# Patient Record
Sex: MALE | Race: WHITE | NOT HISPANIC OR LATINO | Employment: OTHER | URBAN - METROPOLITAN AREA
[De-identification: names, ages, dates, MRNs, and addresses within clinical notes are randomized per-mention and may not be internally consistent; named-entity substitution may affect disease eponyms.]

---

## 2017-04-06 ENCOUNTER — OFFICE VISIT (OUTPATIENT)
Dept: CARDIOLOGY | Facility: CLINIC | Age: 72
End: 2017-04-06

## 2017-04-06 VITALS
HEIGHT: 70 IN | RESPIRATION RATE: 18 BRPM | BODY MASS INDEX: 24.48 KG/M2 | SYSTOLIC BLOOD PRESSURE: 150 MMHG | WEIGHT: 171 LBS | DIASTOLIC BLOOD PRESSURE: 62 MMHG | HEART RATE: 82 BPM

## 2017-04-06 DIAGNOSIS — I65.29 STENOSIS OF CAROTID ARTERY, UNSPECIFIED LATERALITY: ICD-10-CM

## 2017-04-06 DIAGNOSIS — N18.6 END-STAGE RENAL DISEASE (HCC): ICD-10-CM

## 2017-04-06 DIAGNOSIS — E78.2 MIXED HYPERLIPIDEMIA: ICD-10-CM

## 2017-04-06 DIAGNOSIS — I10 ESSENTIAL HYPERTENSION: ICD-10-CM

## 2017-04-06 DIAGNOSIS — I25.10 CORONARY ARTERIOSCLEROSIS IN NATIVE ARTERY: Primary | ICD-10-CM

## 2017-04-06 DIAGNOSIS — I71.40 ABDOMINAL AORTIC ANEURYSM (AAA) WITHOUT RUPTURE (HCC): ICD-10-CM

## 2017-04-06 PROCEDURE — 93000 ELECTROCARDIOGRAM COMPLETE: CPT | Performed by: INTERNAL MEDICINE

## 2017-04-06 PROCEDURE — 99214 OFFICE O/P EST MOD 30 MIN: CPT | Performed by: INTERNAL MEDICINE

## 2017-04-06 NOTE — PROGRESS NOTES
PATIENTINFORMATION    Date of Office Visit: 2017  Encounter Provider: Jane Rider MD  Place of Service: [facility] CARDIOLOGY  Patient Name: Lukasz Flynn  : 1945    Subjective:     Encounter Date:2017      Patient ID: Lukasz Flynn is a 71 y.o. male.      History of Present Illness    The patient is a 70-year-old male who I have been following when he comes in for visits since the winter of 2011. I initially saw him for an abnormal EKG. A stress test in 2011 showed a diminished ejection fraction at 40-45% with left ventricular hypertrophy. The stress test was abnormal which led to a heart catheterization . This showed significant three vessel coronary disease. While in the hospital in 2011 for his heart catheterization, he was found to have significant carotid disease. An endovascular attempt to stent his carotid was unsuccessful. In 2011 he had a left carotid endarterectomy. In 2011 he was admitted with renal failure which required dialysis. 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 After he left the hospital in the spring of 2011 he failed to follow up with me. He did followup with his nephrologist and was placed on hemodialysis.      I saw him again in the hospital in the spring of 2012. At this time, he was admitted with MRSA bacteremia and a non-ST elevation myocardial infarction. Repeat heart catheterization  continued to show significant three vessel disease. A repeat transesophageal echocardiogram on 2012 showed no evidence of endocarditis, but he did have a small patent foramen ovale and significant atherosclerotic disease throughout the entire aorta with multiple mobile plaques identified. He came back to see me in the office in 2012 and was doing quite well. I had him followup with Dr. Anthony to schedule his bypass surgery. He was scheduled for  but presented on  June 30th with chest pain and a non-ST elevation myocardial infarction. He emergently went to the operating room on July 1st where he had a four vessel bypass including a LIMA to the LAD, vein graft to the right coronary artery, vein graft to the first marginal branch of the circumflex and vein graft to the second marginal branch of the circumflex. 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 Overall, his postoperative course was unremarkable. I saw him in August 2012 and at that time he was doing well.     I saw him in 04/2015, which was about three years after his last appointment. At that time, he was complaining of noticing some spots in his eyes and some of his field of vision being obscured and brown. He has had a left carotid endarterectomy. His right carotid has apparently been stable at 60% and he follows with one of the vascular surgeons. I ordered a 48-hour Holter monitor 4/15, which was normal. I ordered an Echocardiogram 4/15, which showed left ventricular systolic function with ejection fraction of 57%, stage I-A diastolic dysfunction, mild left atrial enlargement, and no significant valvular heart disease. He also had an MRI of the brain, which showed no acute infarct.  There were tiny old infarcts noted as well as some hemosiderin deposits likely from old microhemorrhages in the past. He was supposed to follow up with me but did not.  Before Christmas 2015, he was on a chair trying to fix a clock and the chair slipped and  he fell and broke his left wrist. He came to see me in January 2016 for preoperative clearance for her prescription wrist surgery.     He comes in today for his yearly appointment.  He has been feeling well.  He denies chest pain, shortness of breath, lower extremity edema, fatigue.  He is tolerating dialysis.  He says his blood pressures usually little high at the start of dialysis but is low at the end of dialysis.  He is now following with Dr. Ge  "for his vascular disease.    Review of Systems   Constitution: Negative for fever, malaise/fatigue, weight gain and weight loss.   HENT: Negative for ear pain, hearing loss, nosebleeds and sore throat.    Eyes: Negative for double vision, pain, vision loss in left eye and vision loss in right eye.   Cardiovascular:        See history of present illness.   Respiratory: Negative for cough, shortness of breath, sleep disturbances due to breathing, snoring and wheezing.    Endocrine: Negative for cold intolerance, heat intolerance and polyuria.   Skin: Negative for itching, poor wound healing and rash.   Musculoskeletal: Negative for joint pain, joint swelling and myalgias.   Gastrointestinal: Negative for abdominal pain, diarrhea, hematochezia, nausea and vomiting.   Genitourinary: Negative for hematuria and hesitancy.   Neurological: Negative for numbness, paresthesias and seizures.   Psychiatric/Behavioral: Negative for depression. The patient is not nervous/anxious.            ECG 12 Lead  Date/Time: 4/6/2017 10:58 AM  Performed by: JOSE EDGAR  Authorized by: JOSE EDGAR   Comparison: compared with previous ECG from 4/5/2016  Similar to previous ECG  Rhythm: sinus rhythm  BPM: 82  Other findings: LVH with strain  Clinical impression: abnormal ECG               Objective:     /62 (BP Location: Left arm, Patient Position: Sitting, Cuff Size: Adult)  Pulse 82  Resp 18  Ht 70\" (177.8 cm)  Wt 171 lb (77.6 kg)  BMI 24.54 kg/m2 Body mass index is 24.54 kg/(m^2).     Physical Exam   Constitutional: He appears well-developed.   HENT:   Head: Normocephalic and atraumatic.   Eyes: Conjunctivae and lids are normal. Pupils are equal, round, and reactive to light. Lids are everted and swept, no foreign bodies found.   Neck: Normal range of motion. No JVD present. Carotid bruit is not present. No tracheal deviation present. No thyroid mass present.   Cardiovascular: Normal rate, regular rhythm and normal " heart sounds.    Pulses:       Dorsalis pedis pulses are 2+ on the right side, and 2+ on the left side.   Pulmonary/Chest: Effort normal and breath sounds normal.   Abdominal: Normal appearance and bowel sounds are normal.   Musculoskeletal: Normal range of motion.   Neurological: He is alert. He has normal strength.   Skin: Skin is warm, dry and intact.   Psychiatric: He has a normal mood and affect. His behavior is normal.   Vitals reviewed.      Lab Review: There is no recent fasting lipid panel      Assessment/Plan:       1. Coronary artery disease status post bypass in 07/2012. He completed cardiac rehabilitation. He has no symptoms to suggest active ischemia.   Coronary Artery Disease  Assessment  • The patient has no angina    Plan  • Lifestyle modifications discussed include medication compliance, regular exercise and regular monitoring of cholesterol and blood pressure    Subjective - Objective  • There is a history of previous coronary artery bypass graft on or around 7/1/2012  • Current antiplatelet therapy includes aspirin 81 mg      2. History of abnormal left ventricular systolic function. His last ejection fraction was normal at 57% but he did have some anterolateral hypokinesis. He is on a beta-blocker.   3. Peripheral vascular disease status post left carotid endarterectomy. He follows with Dr. Lovell.    4. Infra-abdominal aortic aneurysm s/p stent graft. followed by .   5. Hypertension controlled.  His blood pressures a little bit high.  I don't want to adjust his medicines in case he starts having hypotension with dialysis.   6. Hyperlipidemia.  It has been 2 years since his last lipid panel.  I put in orders for one today.  He has not fasting so he will do it next week.  I want his LDL to be less than 70 given his extensive atheromatous burden.  He has never tried Lipitor or Crestor.  7. History of pulmonary nodules.   8. End-stage renal disease. He is on hemodialysis.   9. History of  stroke.   10. History of poor nutrition with a history of G-tube but he is no longer having issues with that.   11. History of Methicillin-resistant Staphylococcus aureus bacteremia.      I encouraged regular exercise. I will see him back in one year unless he has problems sooner.      Orders Placed This Encounter   Procedures   • Lipid Panel     Standing Status:   Future     Standing Expiration Date:   4/6/2018   • Comprehensive Metabolic Panel     Standing Status:   Future     Standing Expiration Date:   4/6/2018      Lukasz Flynn   Home Medication Instructions MARIBEL:    Printed on:04/06/17 0325   Medication Information                      aspirin 81 MG chewable tablet  Chew 81 mg daily. LAST DOSE 6/8/2016             Lactobacillus (ACIDOPHILUS) 100 MG capsule  Take 1 tablet by mouth daily.             metoprolol succinate XL (TOPROL-XL) 25 MG 24 hr tablet  Take 25 mg by mouth every night.             Multiple Vitamin (MULTI VITAMIN DAILY PO)  Take 1 tablet by mouth daily. LAST DOSE 6/2/2016             simvastatin (ZOCOR) 40 MG tablet  Take 40 mg by mouth every night.                        Jane Rider MD  04/06/17  10:55 AM

## 2017-04-11 ENCOUNTER — RESULTS ENCOUNTER (OUTPATIENT)
Dept: CARDIOLOGY | Facility: CLINIC | Age: 72
End: 2017-04-11

## 2017-04-11 DIAGNOSIS — E78.2 MIXED HYPERLIPIDEMIA: ICD-10-CM

## 2017-04-14 ENCOUNTER — APPOINTMENT (OUTPATIENT)
Dept: LAB | Facility: HOSPITAL | Age: 72
End: 2017-04-14

## 2017-04-14 ENCOUNTER — TELEPHONE (OUTPATIENT)
Dept: CARDIOLOGY | Facility: CLINIC | Age: 72
End: 2017-04-14

## 2017-04-14 DIAGNOSIS — I25.10 CORONARY ARTERY DISEASE INVOLVING NATIVE CORONARY ARTERY OF NATIVE HEART WITHOUT ANGINA PECTORIS: ICD-10-CM

## 2017-04-14 DIAGNOSIS — E78.2 MIXED HYPERLIPIDEMIA: Primary | ICD-10-CM

## 2017-04-14 LAB
ALBUMIN SERPL-MCNC: 4.2 G/DL (ref 3.5–5.2)
ALBUMIN/GLOB SERPL: 1.1 G/DL
ALP SERPL-CCNC: 97 U/L (ref 39–117)
ALT SERPL W P-5'-P-CCNC: 13 U/L (ref 1–41)
ANION GAP SERPL CALCULATED.3IONS-SCNC: 11.4 MMOL/L
AST SERPL-CCNC: 19 U/L (ref 1–40)
BILIRUB SERPL-MCNC: 0.6 MG/DL (ref 0.1–1.2)
BUN BLD-MCNC: 8 MG/DL (ref 8–23)
BUN/CREAT SERPL: 4.4 (ref 7–25)
CALCIUM SPEC-SCNC: 8.7 MG/DL (ref 8.6–10.5)
CHLORIDE SERPL-SCNC: 94 MMOL/L (ref 98–107)
CHOLEST SERPL-MCNC: 181 MG/DL (ref 0–200)
CO2 SERPL-SCNC: 36.6 MMOL/L (ref 22–29)
CREAT BLD-MCNC: 1.83 MG/DL (ref 0.76–1.27)
GFR SERPL CREATININE-BSD FRML MDRD: 37 ML/MIN/1.73
GLOBULIN UR ELPH-MCNC: 3.8 GM/DL
GLUCOSE BLD-MCNC: 168 MG/DL (ref 65–99)
HDLC SERPL-MCNC: 49 MG/DL (ref 40–60)
LDLC SERPL CALC-MCNC: 115 MG/DL (ref 0–100)
LDLC/HDLC SERPL: 2.34 {RATIO}
POTASSIUM BLD-SCNC: 3.4 MMOL/L (ref 3.5–5.2)
PROT SERPL-MCNC: 8 G/DL (ref 6–8.5)
SODIUM BLD-SCNC: 142 MMOL/L (ref 136–145)
TRIGL SERPL-MCNC: 87 MG/DL (ref 0–150)
VLDLC SERPL-MCNC: 17.4 MG/DL (ref 5–40)

## 2017-04-14 PROCEDURE — 36415 COLL VENOUS BLD VENIPUNCTURE: CPT | Performed by: INTERNAL MEDICINE

## 2017-04-14 PROCEDURE — 80053 COMPREHEN METABOLIC PANEL: CPT | Performed by: INTERNAL MEDICINE

## 2017-04-14 PROCEDURE — 80061 LIPID PANEL: CPT | Performed by: INTERNAL MEDICINE

## 2017-04-14 RX ORDER — ROSUVASTATIN CALCIUM 40 MG/1
40 TABLET, COATED ORAL DAILY
Qty: 30 TABLET | Refills: 11 | Status: SHIPPED | OUTPATIENT
Start: 2017-04-14 | End: 2018-02-27

## 2017-04-14 NOTE — TELEPHONE ENCOUNTER
I left him a message that his LDL was not at goal.  I have discontinued simvastatin and started Crestor 40 mg a day.  I put in for blood work in 6 weeks.  Will you remind him the week of his blood work that he needs to have fasting labs drawn thanks

## 2017-05-25 ENCOUNTER — LAB (OUTPATIENT)
Dept: LAB | Facility: HOSPITAL | Age: 72
End: 2017-05-25

## 2017-05-25 ENCOUNTER — TELEPHONE (OUTPATIENT)
Dept: CARDIOLOGY | Facility: CLINIC | Age: 72
End: 2017-05-25

## 2017-05-25 ENCOUNTER — TRANSCRIBE ORDERS (OUTPATIENT)
Dept: INFUSION THERAPY | Facility: HOSPITAL | Age: 72
End: 2017-05-25

## 2017-05-25 DIAGNOSIS — M72.0 CONTRACTURE OF PALMAR FASCIA: Primary | ICD-10-CM

## 2017-05-25 DIAGNOSIS — E78.2 MIXED HYPERLIPIDEMIA: ICD-10-CM

## 2017-05-25 DIAGNOSIS — I25.10 CORONARY ARTERY DISEASE INVOLVING NATIVE CORONARY ARTERY OF NATIVE HEART WITHOUT ANGINA PECTORIS: ICD-10-CM

## 2017-05-25 LAB
ALBUMIN SERPL-MCNC: 4 G/DL (ref 3.5–5.2)
ALBUMIN/GLOB SERPL: 1.1 G/DL
ALP SERPL-CCNC: 84 U/L (ref 39–117)
ALT SERPL W P-5'-P-CCNC: 17 U/L (ref 1–41)
ANION GAP SERPL CALCULATED.3IONS-SCNC: 12.4 MMOL/L
AST SERPL-CCNC: 23 U/L (ref 1–40)
BILIRUB SERPL-MCNC: 0.4 MG/DL (ref 0.1–1.2)
BUN BLD-MCNC: 28 MG/DL (ref 8–23)
BUN/CREAT SERPL: 8 (ref 7–25)
CALCIUM SPEC-SCNC: 9.4 MG/DL (ref 8.6–10.5)
CHLORIDE SERPL-SCNC: 92 MMOL/L (ref 98–107)
CHOLEST SERPL-MCNC: 165 MG/DL (ref 0–200)
CO2 SERPL-SCNC: 34.6 MMOL/L (ref 22–29)
CREAT BLD-MCNC: 3.49 MG/DL (ref 0.76–1.27)
GFR SERPL CREATININE-BSD FRML MDRD: 17 ML/MIN/1.73
GLOBULIN UR ELPH-MCNC: 3.7 GM/DL
GLUCOSE BLD-MCNC: 174 MG/DL (ref 65–99)
HDLC SERPL-MCNC: 48 MG/DL (ref 40–60)
LDLC SERPL CALC-MCNC: 97 MG/DL (ref 0–100)
LDLC/HDLC SERPL: 2.03 {RATIO}
POTASSIUM BLD-SCNC: 3.7 MMOL/L (ref 3.5–5.2)
PROT SERPL-MCNC: 7.7 G/DL (ref 6–8.5)
SODIUM BLD-SCNC: 139 MMOL/L (ref 136–145)
TRIGL SERPL-MCNC: 98 MG/DL (ref 0–150)
VLDLC SERPL-MCNC: 19.6 MG/DL (ref 5–40)

## 2017-05-25 PROCEDURE — 80061 LIPID PANEL: CPT | Performed by: INTERNAL MEDICINE

## 2017-05-25 PROCEDURE — 36415 COLL VENOUS BLD VENIPUNCTURE: CPT

## 2017-05-25 PROCEDURE — 80053 COMPREHEN METABOLIC PANEL: CPT

## 2017-05-25 RX ORDER — EZETIMIBE 10 MG/1
10 TABLET ORAL DAILY
Qty: 30 TABLET | Refills: 11 | Status: SHIPPED | OUTPATIENT
Start: 2017-05-25 | End: 2017-06-08 | Stop reason: ALTCHOICE

## 2017-05-28 ENCOUNTER — ANESTHESIA EVENT (OUTPATIENT)
Dept: PERIOP | Facility: HOSPITAL | Age: 72
End: 2017-05-28

## 2017-05-28 ENCOUNTER — APPOINTMENT (OUTPATIENT)
Dept: GENERAL RADIOLOGY | Facility: HOSPITAL | Age: 72
End: 2017-05-28

## 2017-05-28 ENCOUNTER — ANESTHESIA (OUTPATIENT)
Dept: PERIOP | Facility: HOSPITAL | Age: 72
End: 2017-05-28

## 2017-05-28 ENCOUNTER — HOSPITAL ENCOUNTER (EMERGENCY)
Facility: HOSPITAL | Age: 72
Discharge: HOME | End: 2017-05-28
Attending: EMERGENCY MEDICINE | Admitting: INTERNAL MEDICINE

## 2017-05-28 ENCOUNTER — ON CAMPUS - OUTPATIENT (OUTPATIENT)
Dept: URBAN - METROPOLITAN AREA HOSPITAL 114 | Facility: HOSPITAL | Age: 72
End: 2017-05-28
Payer: COMMERCIAL

## 2017-05-28 VITALS
SYSTOLIC BLOOD PRESSURE: 149 MMHG | WEIGHT: 173 LBS | TEMPERATURE: 98.3 F | HEIGHT: 70 IN | DIASTOLIC BLOOD PRESSURE: 119 MMHG | RESPIRATION RATE: 16 BRPM | BODY MASS INDEX: 24.77 KG/M2 | HEART RATE: 66 BPM | OXYGEN SATURATION: 94 %

## 2017-05-28 DIAGNOSIS — T18.128A ESOPHAGEAL OBSTRUCTION DUE TO FOOD IMPACTION: Primary | ICD-10-CM

## 2017-05-28 DIAGNOSIS — K22.2 ESOPHAGEAL OBSTRUCTION: ICD-10-CM

## 2017-05-28 DIAGNOSIS — K22.2 ESOPHAGEAL OBSTRUCTION DUE TO FOOD IMPACTION: Primary | ICD-10-CM

## 2017-05-28 DIAGNOSIS — K29.50 UNSPECIFIED CHRONIC GASTRITIS WITHOUT BLEEDING: ICD-10-CM

## 2017-05-28 DIAGNOSIS — N18.5 CRF (CHRONIC RENAL FAILURE), STAGE 5 (HCC): ICD-10-CM

## 2017-05-28 DIAGNOSIS — T18.128A FOOD IN ESOPHAGUS CAUSING OTHER INJURY, INITIAL ENCOUNTER: ICD-10-CM

## 2017-05-28 DIAGNOSIS — K44.9 DIAPHRAGMATIC HERNIA WITHOUT OBSTRUCTION OR GANGRENE: ICD-10-CM

## 2017-05-28 LAB
ALBUMIN SERPL-MCNC: 4.1 G/DL (ref 3.5–5.2)
ALBUMIN/GLOB SERPL: 1.1 G/DL
ALP SERPL-CCNC: 79 U/L (ref 39–117)
ALT SERPL W P-5'-P-CCNC: 20 U/L (ref 1–41)
ANION GAP SERPL CALCULATED.3IONS-SCNC: 15.1 MMOL/L
AST SERPL-CCNC: 26 U/L (ref 1–40)
BASOPHILS # BLD AUTO: 0.02 10*3/MM3 (ref 0–0.2)
BASOPHILS NFR BLD AUTO: 0.2 % (ref 0–1.5)
BILIRUB SERPL-MCNC: 0.7 MG/DL (ref 0.1–1.2)
BUN BLD-MCNC: 40 MG/DL (ref 8–23)
BUN/CREAT SERPL: 11.3 (ref 7–25)
CALCIUM SPEC-SCNC: 10.1 MG/DL (ref 8.6–10.5)
CHLORIDE SERPL-SCNC: 98 MMOL/L (ref 98–107)
CO2 SERPL-SCNC: 26.9 MMOL/L (ref 22–29)
CREAT BLD-MCNC: 3.54 MG/DL (ref 0.76–1.27)
DEPRECATED RDW RBC AUTO: 50 FL (ref 37–54)
EOSINOPHIL # BLD AUTO: 0.18 10*3/MM3 (ref 0–0.7)
EOSINOPHIL NFR BLD AUTO: 1.8 % (ref 0.3–6.2)
ERYTHROCYTE [DISTWIDTH] IN BLOOD BY AUTOMATED COUNT: 14.7 % (ref 11.5–14.5)
GFR SERPL CREATININE-BSD FRML MDRD: 17 ML/MIN/1.73
GLOBULIN UR ELPH-MCNC: 3.9 GM/DL
GLUCOSE BLD-MCNC: 135 MG/DL (ref 65–99)
GLUCOSE BLDC GLUCOMTR-MCNC: 143 MG/DL (ref 70–130)
HCT VFR BLD AUTO: 38.7 % (ref 40.4–52.2)
HGB BLD-MCNC: 12.8 G/DL (ref 13.7–17.6)
HOLD SPECIMEN: NORMAL
IMM GRANULOCYTES # BLD: 0.04 10*3/MM3 (ref 0–0.03)
IMM GRANULOCYTES NFR BLD: 0.4 % (ref 0–0.5)
LYMPHOCYTES # BLD AUTO: 2.55 10*3/MM3 (ref 0.9–4.8)
LYMPHOCYTES NFR BLD AUTO: 26.2 % (ref 19.6–45.3)
MCH RBC QN AUTO: 31.2 PG (ref 27–32.7)
MCHC RBC AUTO-ENTMCNC: 33.1 G/DL (ref 32.6–36.4)
MCV RBC AUTO: 94.4 FL (ref 79.8–96.2)
MONOCYTES # BLD AUTO: 1.13 10*3/MM3 (ref 0.2–1.2)
MONOCYTES NFR BLD AUTO: 11.6 % (ref 5–12)
NEUTROPHILS # BLD AUTO: 5.82 10*3/MM3 (ref 1.9–8.1)
NEUTROPHILS NFR BLD AUTO: 59.8 % (ref 42.7–76)
PLATELET # BLD AUTO: 170 10*3/MM3 (ref 140–500)
PMV BLD AUTO: 11 FL (ref 6–12)
POTASSIUM BLD-SCNC: 4.1 MMOL/L (ref 3.5–5.2)
PROT SERPL-MCNC: 8 G/DL (ref 6–8.5)
RBC # BLD AUTO: 4.1 10*6/MM3 (ref 4.6–6)
SODIUM BLD-SCNC: 140 MMOL/L (ref 136–145)
WBC NRBC COR # BLD: 9.74 10*3/MM3 (ref 4.5–10.7)
WHOLE BLOOD HOLD SPECIMEN: NORMAL

## 2017-05-28 PROCEDURE — 25010000002 PROPOFOL 10 MG/ML EMULSION: Performed by: ANESTHESIOLOGY

## 2017-05-28 PROCEDURE — C1726 CATH, BAL DIL, NON-VASCULAR: HCPCS | Performed by: INTERNAL MEDICINE

## 2017-05-28 PROCEDURE — 93005 ELECTROCARDIOGRAM TRACING: CPT | Performed by: EMERGENCY MEDICINE

## 2017-05-28 PROCEDURE — 71020 HC CHEST PA AND LATERAL: CPT

## 2017-05-28 PROCEDURE — 99283 EMERGENCY DEPT VISIT LOW MDM: CPT

## 2017-05-28 PROCEDURE — 80053 COMPREHEN METABOLIC PANEL: CPT | Performed by: EMERGENCY MEDICINE

## 2017-05-28 PROCEDURE — 93010 ELECTROCARDIOGRAM REPORT: CPT | Performed by: INTERNAL MEDICINE

## 2017-05-28 PROCEDURE — 85025 COMPLETE CBC W/AUTO DIFF WBC: CPT | Performed by: EMERGENCY MEDICINE

## 2017-05-28 PROCEDURE — 43239 EGD BIOPSY SINGLE/MULTIPLE: CPT | Mod: 59 | Performed by: INTERNAL MEDICINE

## 2017-05-28 PROCEDURE — 25010000002 MIDAZOLAM PER 1 MG: Performed by: ANESTHESIOLOGY

## 2017-05-28 PROCEDURE — 43249 ESOPH EGD DILATION <30 MM: CPT | Performed by: INTERNAL MEDICINE

## 2017-05-28 PROCEDURE — 25010000002 ONDANSETRON PER 1 MG: Performed by: ANESTHESIOLOGY

## 2017-05-28 PROCEDURE — 82962 GLUCOSE BLOOD TEST: CPT

## 2017-05-28 PROCEDURE — 25010000002 SUCCINYLCHOLINE PER 20 MG: Performed by: ANESTHESIOLOGY

## 2017-05-28 PROCEDURE — 88312 SPECIAL STAINS GROUP 1: CPT | Performed by: INTERNAL MEDICINE

## 2017-05-28 PROCEDURE — 88305 TISSUE EXAM BY PATHOLOGIST: CPT | Performed by: INTERNAL MEDICINE

## 2017-05-28 RX ORDER — MIDAZOLAM HYDROCHLORIDE 1 MG/ML
2 INJECTION INTRAMUSCULAR; INTRAVENOUS
Status: DISCONTINUED | OUTPATIENT
Start: 2017-05-28 | End: 2017-05-28 | Stop reason: HOSPADM

## 2017-05-28 RX ORDER — SODIUM CHLORIDE 0.9 % (FLUSH) 0.9 %
1-10 SYRINGE (ML) INJECTION AS NEEDED
Status: DISCONTINUED | OUTPATIENT
Start: 2017-05-28 | End: 2017-05-28 | Stop reason: HOSPADM

## 2017-05-28 RX ORDER — ONDANSETRON 2 MG/ML
INJECTION INTRAMUSCULAR; INTRAVENOUS AS NEEDED
Status: DISCONTINUED | OUTPATIENT
Start: 2017-05-28 | End: 2017-05-28 | Stop reason: SURG

## 2017-05-28 RX ORDER — SODIUM CHLORIDE 0.9 % (FLUSH) 0.9 %
10 SYRINGE (ML) INJECTION AS NEEDED
Status: DISCONTINUED | OUTPATIENT
Start: 2017-05-28 | End: 2017-05-28 | Stop reason: HOSPADM

## 2017-05-28 RX ORDER — FLUMAZENIL 0.1 MG/ML
0.2 INJECTION INTRAVENOUS AS NEEDED
Status: DISCONTINUED | OUTPATIENT
Start: 2017-05-28 | End: 2017-05-28 | Stop reason: HOSPADM

## 2017-05-28 RX ORDER — GLYCOPYRROLATE 0.2 MG/ML
0.2 INJECTION INTRAMUSCULAR; INTRAVENOUS
Status: DISCONTINUED | OUTPATIENT
Start: 2017-05-28 | End: 2017-05-28 | Stop reason: HOSPADM

## 2017-05-28 RX ORDER — LABETALOL HYDROCHLORIDE 5 MG/ML
5 INJECTION, SOLUTION INTRAVENOUS
Status: DISCONTINUED | OUTPATIENT
Start: 2017-05-28 | End: 2017-05-28 | Stop reason: HOSPADM

## 2017-05-28 RX ORDER — ONDANSETRON 2 MG/ML
4 INJECTION INTRAMUSCULAR; INTRAVENOUS ONCE AS NEEDED
Status: DISCONTINUED | OUTPATIENT
Start: 2017-05-28 | End: 2017-05-28 | Stop reason: HOSPADM

## 2017-05-28 RX ORDER — HYDROMORPHONE HYDROCHLORIDE 1 MG/ML
0.5 INJECTION, SOLUTION INTRAMUSCULAR; INTRAVENOUS; SUBCUTANEOUS
Status: DISCONTINUED | OUTPATIENT
Start: 2017-05-28 | End: 2017-05-28 | Stop reason: HOSPADM

## 2017-05-28 RX ORDER — HYDRALAZINE HYDROCHLORIDE 20 MG/ML
5 INJECTION INTRAMUSCULAR; INTRAVENOUS
Status: DISCONTINUED | OUTPATIENT
Start: 2017-05-28 | End: 2017-05-28 | Stop reason: HOSPADM

## 2017-05-28 RX ORDER — SODIUM CHLORIDE 9 MG/ML
9 INJECTION, SOLUTION INTRAVENOUS CONTINUOUS PRN
Status: DISCONTINUED | OUTPATIENT
Start: 2017-05-28 | End: 2017-05-28 | Stop reason: HOSPADM

## 2017-05-28 RX ORDER — DIPHENHYDRAMINE HYDROCHLORIDE 50 MG/ML
6.25 INJECTION INTRAMUSCULAR; INTRAVENOUS
Status: DISCONTINUED | OUTPATIENT
Start: 2017-05-28 | End: 2017-05-28 | Stop reason: HOSPADM

## 2017-05-28 RX ORDER — HYDROCODONE BITARTRATE AND ACETAMINOPHEN 7.5; 325 MG/1; MG/1
1 TABLET ORAL ONCE AS NEEDED
Status: DISCONTINUED | OUTPATIENT
Start: 2017-05-28 | End: 2017-05-28 | Stop reason: HOSPADM

## 2017-05-28 RX ORDER — MIDAZOLAM HYDROCHLORIDE 1 MG/ML
1 INJECTION INTRAMUSCULAR; INTRAVENOUS
Status: DISCONTINUED | OUTPATIENT
Start: 2017-05-28 | End: 2017-05-28 | Stop reason: HOSPADM

## 2017-05-28 RX ORDER — SUCCINYLCHOLINE CHLORIDE 20 MG/ML
INJECTION INTRAMUSCULAR; INTRAVENOUS AS NEEDED
Status: DISCONTINUED | OUTPATIENT
Start: 2017-05-28 | End: 2017-05-28 | Stop reason: SURG

## 2017-05-28 RX ORDER — OXYCODONE HYDROCHLORIDE AND ACETAMINOPHEN 5; 325 MG/1; MG/1
2 TABLET ORAL ONCE AS NEEDED
Status: DISCONTINUED | OUTPATIENT
Start: 2017-05-28 | End: 2017-05-28 | Stop reason: HOSPADM

## 2017-05-28 RX ORDER — FENTANYL CITRATE 50 UG/ML
100 INJECTION, SOLUTION INTRAMUSCULAR; INTRAVENOUS
Status: DISCONTINUED | OUTPATIENT
Start: 2017-05-28 | End: 2017-05-28 | Stop reason: HOSPADM

## 2017-05-28 RX ORDER — PROPOFOL 10 MG/ML
VIAL (ML) INTRAVENOUS AS NEEDED
Status: DISCONTINUED | OUTPATIENT
Start: 2017-05-28 | End: 2017-05-28 | Stop reason: SURG

## 2017-05-28 RX ORDER — FAMOTIDINE 10 MG/ML
20 INJECTION, SOLUTION INTRAVENOUS ONCE
Status: COMPLETED | OUTPATIENT
Start: 2017-05-28 | End: 2017-05-28

## 2017-05-28 RX ORDER — FENTANYL CITRATE 50 UG/ML
50 INJECTION, SOLUTION INTRAMUSCULAR; INTRAVENOUS
Status: DISCONTINUED | OUTPATIENT
Start: 2017-05-28 | End: 2017-05-28 | Stop reason: HOSPADM

## 2017-05-28 RX ADMIN — SUCCINYLCHOLINE CHLORIDE 50 MG: 20 INJECTION, SOLUTION INTRAMUSCULAR; INTRAVENOUS; PARENTERAL at 14:54

## 2017-05-28 RX ADMIN — PROPOFOL 30 MG: 10 INJECTION, EMULSION INTRAVENOUS at 14:56

## 2017-05-28 RX ADMIN — PROPOFOL 150 MG: 10 INJECTION, EMULSION INTRAVENOUS at 14:54

## 2017-05-28 RX ADMIN — GLYCOPYRROLATE 0.2 MG: 0.2 INJECTION INTRAMUSCULAR; INTRAVENOUS at 14:23

## 2017-05-28 RX ADMIN — FAMOTIDINE 20 MG: 10 INJECTION, SOLUTION INTRAVENOUS at 14:22

## 2017-05-28 RX ADMIN — SODIUM CHLORIDE 9 ML/HR: 9 INJECTION, SOLUTION INTRAVENOUS at 14:23

## 2017-05-28 RX ADMIN — ONDANSETRON 4 MG: 2 INJECTION INTRAMUSCULAR; INTRAVENOUS at 15:00

## 2017-05-28 RX ADMIN — MIDAZOLAM HYDROCHLORIDE 1 MG: 1 INJECTION, SOLUTION INTRAMUSCULAR; INTRAVENOUS at 14:22

## 2017-05-28 RX ADMIN — PROPOFOL 50 MG: 10 INJECTION, EMULSION INTRAVENOUS at 15:11

## 2017-05-30 LAB
CYTO UR: NORMAL
LAB AP CASE REPORT: NORMAL
Lab: NORMAL
PATH REPORT.FINAL DX SPEC: NORMAL
PATH REPORT.GROSS SPEC: NORMAL

## 2017-06-07 PROBLEM — M72.0 DUPUYTREN'S CONTRACTURE OF HAND: Status: ACTIVE | Noted: 2017-06-07

## 2017-06-08 ENCOUNTER — HOSPITAL ENCOUNTER (OUTPATIENT)
Dept: INFUSION THERAPY | Facility: HOSPITAL | Age: 72
Discharge: HOME | End: 2017-06-08
Attending: PLASTIC SURGERY | Admitting: PLASTIC SURGERY

## 2017-06-08 VITALS
SYSTOLIC BLOOD PRESSURE: 147 MMHG | TEMPERATURE: 97.8 F | RESPIRATION RATE: 20 BRPM | OXYGEN SATURATION: 97 % | DIASTOLIC BLOOD PRESSURE: 54 MMHG | HEART RATE: 61 BPM

## 2017-06-08 DIAGNOSIS — M72.0 DUPUYTREN'S CONTRACTURE OF HAND: ICD-10-CM

## 2017-06-08 PROCEDURE — 25010000002 COLLAGENASE CLOSTRID HISTOLYT 0.9 MG RECONSTITUTED SOLUTION: Performed by: PLASTIC SURGERY

## 2017-06-08 PROCEDURE — 96372 THER/PROPH/DIAG INJ SC/IM: CPT

## 2017-06-08 RX ADMIN — COLLAGENASE CLOSTRIDIUM HISTOLYTICUM: KIT at 15:00

## 2017-06-08 NOTE — PROGRESS NOTES
Medications added to the MAR by the ACU nurse were used for procedure by provider.  See provider procedure dictation for details regarding provider's method and timing of administration as well as dosages.    Pt given discharge instructions per Dr Ferguson and pt verbalized understanding.  Written instructions given per RN.  Pt dc'ed ambulatory at 1312.

## 2017-06-08 NOTE — PATIENT INSTRUCTIONS
Pt has appt on Monday with Dr Ferguson.   Pt instructed on potential bruising at site that could go up to his arm and chest wall.  Pt also instructed on potential for itching and may take benadryl.

## 2018-02-06 ENCOUNTER — TRANSCRIBE ORDERS (OUTPATIENT)
Dept: ADMINISTRATIVE | Facility: HOSPITAL | Age: 73
End: 2018-02-06

## 2018-02-06 DIAGNOSIS — I71.40 AAA (ABDOMINAL AORTIC ANEURYSM) WITHOUT RUPTURE (HCC): Primary | ICD-10-CM

## 2018-02-14 ENCOUNTER — HOSPITAL ENCOUNTER (OUTPATIENT)
Dept: CT IMAGING | Facility: HOSPITAL | Age: 73
Discharge: HOME | End: 2018-02-14
Attending: SURGERY | Admitting: SURGERY

## 2018-02-14 DIAGNOSIS — I71.40 AAA (ABDOMINAL AORTIC ANEURYSM) WITHOUT RUPTURE (HCC): ICD-10-CM

## 2018-02-14 PROCEDURE — 82565 ASSAY OF CREATININE: CPT

## 2018-02-14 PROCEDURE — 0 IOPAMIDOL 61 % SOLUTION: Performed by: SURGERY

## 2018-02-14 PROCEDURE — 74174 CTA ABD&PLVS W/CONTRAST: CPT

## 2018-02-14 RX ADMIN — IOPAMIDOL 95 ML: 612 INJECTION, SOLUTION INTRAVENOUS at 09:45

## 2018-02-15 LAB — CREAT BLDA-MCNC: 3.7 MG/DL (ref 0.6–1.3)

## 2018-02-21 ENCOUNTER — OFFICE VISIT (OUTPATIENT)
Dept: FAMILY MEDICINE CLINIC | Facility: CLINIC | Age: 73
End: 2018-02-21

## 2018-02-21 VITALS
HEIGHT: 70 IN | DIASTOLIC BLOOD PRESSURE: 58 MMHG | SYSTOLIC BLOOD PRESSURE: 118 MMHG | WEIGHT: 168 LBS | HEART RATE: 85 BPM | TEMPERATURE: 98.7 F | OXYGEN SATURATION: 92 % | BODY MASS INDEX: 24.05 KG/M2

## 2018-02-21 DIAGNOSIS — J01.00 ACUTE NON-RECURRENT MAXILLARY SINUSITIS: Primary | ICD-10-CM

## 2018-02-21 PROCEDURE — 99213 OFFICE O/P EST LOW 20 MIN: CPT | Performed by: FAMILY MEDICINE

## 2018-02-21 RX ORDER — CEFUROXIME AXETIL 250 MG/1
250 TABLET ORAL 2 TIMES DAILY
Qty: 20 TABLET | Refills: 0 | Status: SHIPPED | OUTPATIENT
Start: 2018-02-21 | End: 2018-02-26 | Stop reason: ALTCHOICE

## 2018-02-24 NOTE — PROGRESS NOTES
Subjective   Lukasz Flynn is a 72 y.o. male with   Chief Complaint   Patient presents with   • Cough     mostly dry   • Fatigue     not eating   • Nasal Congestion   .    History of Present Illness   72-year-old white male with more than one week of increase cough, fatigue and nasal congestion.  Cough is nonproductive and for the most part is random throughout day and night.  He is able to sleep.  Appetite has been decreased with the above symptoms.  Patient denies fever and there has been no overt shortness of air or audible wheezing.  Patient denies chest pain.  He also denies nausea, vomiting and diarrhea.  The following portions of the patient's history were reviewed and updated as appropriate: allergies, current medications, past family history, past medical history, past social history, past surgical history and problem list.    Review of Systems   Constitutional: Positive for fatigue. Negative for fever.   HENT: Positive for congestion, postnasal drip and sore throat.    Respiratory: Positive for cough. Negative for shortness of breath and wheezing.    Cardiovascular: Negative for chest pain.       Objective     Vitals:    02/21/18 1638   BP: 118/58   Pulse: 85   Temp: 98.7 °F (37.1 °C)   SpO2: 92%       No results found for this or any previous visit (from the past 168 hour(s)).    Physical Exam   Constitutional: He is oriented to person, place, and time. He appears well-developed and well-nourished.   HENT:   Head: Normocephalic and atraumatic.   Right Ear: Hearing, tympanic membrane, external ear and ear canal normal.   Left Ear: Hearing, tympanic membrane, external ear and ear canal normal.   Nose: Mucosal edema (increased erythema and exudate) present.   Mouth/Throat: Uvula is midline and mucous membranes are normal. Posterior oropharyngeal erythema present.   Neck: Trachea normal and phonation normal. Neck supple. Normal carotid pulses present. Carotid bruit is not present. No thyroid mass and no  thyromegaly present.   Cardiovascular: Normal rate, regular rhythm and normal heart sounds.  Exam reveals no gallop and no friction rub.    No murmur heard.  Pulmonary/Chest: Effort normal and breath sounds normal. No respiratory distress. He has no decreased breath sounds. He has no wheezes. He has no rhonchi. He has no rales.   Lymphadenopathy:     He has cervical adenopathy (shoddy anterior adenopathy).   Neurological: He is alert and oriented to person, place, and time.   Skin: Skin is warm and dry. No rash noted.   Psychiatric: He has a normal mood and affect. His speech is normal and behavior is normal. Judgment and thought content normal. Cognition and memory are normal.   Nursing note and vitals reviewed.      Assessment/Plan   Lukasz was seen today for cough, fatigue and nasal congestion.    Diagnoses and all orders for this visit:    Acute non-recurrent maxillary sinusitis  -     cefuroxime (CEFTIN) 250 MG tablet; Take 1 tablet by mouth 2 (Two) Times a Day.        Return if symptoms worsen or fail to improve.

## 2018-02-26 ENCOUNTER — TELEPHONE (OUTPATIENT)
Dept: FAMILY MEDICINE CLINIC | Facility: CLINIC | Age: 73
End: 2018-02-26

## 2018-02-26 RX ORDER — LEVOFLOXACIN 500 MG/1
500 TABLET, FILM COATED ORAL DAILY
Qty: 10 TABLET | Refills: 0 | Status: SHIPPED | OUTPATIENT
Start: 2018-02-26 | End: 2018-02-27

## 2018-02-26 RX ORDER — PROMETHAZINE HYDROCHLORIDE AND CODEINE PHOSPHATE 6.25; 1 MG/5ML; MG/5ML
5 SYRUP ORAL EVERY 4 HOURS PRN
Qty: 240 ML | Refills: 0 | Status: SHIPPED | OUTPATIENT
Start: 2018-02-26 | End: 2018-04-12

## 2018-02-26 NOTE — TELEPHONE ENCOUNTER
Pts wife is calling states that the Robiutssin he was recommended for his cough isnt helping at all.  And the antibiotic isnt either.  He would like something for his cough and a different antibiotic.

## 2018-02-26 NOTE — TELEPHONE ENCOUNTER
Let patient know that I sent and anabiotic to his pharmacy and you can call in the Phenergan With Codeine cough syrup.

## 2018-02-27 ENCOUNTER — APPOINTMENT (OUTPATIENT)
Dept: PREADMISSION TESTING | Facility: HOSPITAL | Age: 73
End: 2018-02-27

## 2018-02-27 ENCOUNTER — HOSPITAL ENCOUNTER (OUTPATIENT)
Dept: GENERAL RADIOLOGY | Facility: HOSPITAL | Age: 73
Discharge: HOME | End: 2018-02-27
Admitting: SURGERY

## 2018-02-27 VITALS
HEIGHT: 70 IN | OXYGEN SATURATION: 99 % | RESPIRATION RATE: 16 BRPM | DIASTOLIC BLOOD PRESSURE: 54 MMHG | HEART RATE: 64 BPM | WEIGHT: 170 LBS | SYSTOLIC BLOOD PRESSURE: 170 MMHG | TEMPERATURE: 97.8 F | BODY MASS INDEX: 24.34 KG/M2

## 2018-02-27 LAB
ALBUMIN SERPL-MCNC: 3.7 G/DL (ref 3.5–5.2)
ALBUMIN/GLOB SERPL: 0.9 G/DL
ALP SERPL-CCNC: 84 U/L (ref 39–117)
ALT SERPL W P-5'-P-CCNC: 35 U/L (ref 1–41)
ANION GAP SERPL CALCULATED.3IONS-SCNC: 13.5 MMOL/L
APTT PPP: 28.1 SECONDS (ref 22.7–35.4)
AST SERPL-CCNC: 44 U/L (ref 1–40)
BILIRUB SERPL-MCNC: 0.7 MG/DL (ref 0.1–1.2)
BUN BLD-MCNC: 21 MG/DL (ref 8–23)
BUN/CREAT SERPL: 6.2 (ref 7–25)
CALCIUM SPEC-SCNC: 9.3 MG/DL (ref 8.6–10.5)
CHLORIDE SERPL-SCNC: 87 MMOL/L (ref 98–107)
CO2 SERPL-SCNC: 33.5 MMOL/L (ref 22–29)
CREAT BLD-MCNC: 3.39 MG/DL (ref 0.76–1.27)
DEPRECATED RDW RBC AUTO: 51.6 FL (ref 37–54)
ERYTHROCYTE [DISTWIDTH] IN BLOOD BY AUTOMATED COUNT: 14.1 % (ref 11.5–14.5)
GFR SERPL CREATININE-BSD FRML MDRD: 18 ML/MIN/1.73
GLOBULIN UR ELPH-MCNC: 3.9 GM/DL
GLUCOSE BLD-MCNC: 171 MG/DL (ref 65–99)
HCT VFR BLD AUTO: 40 % (ref 40.4–52.2)
HGB BLD-MCNC: 12.9 G/DL (ref 13.7–17.6)
INR PPP: 1.15 (ref 0.9–1.1)
MCH RBC QN AUTO: 32.6 PG (ref 27–32.7)
MCHC RBC AUTO-ENTMCNC: 32.3 G/DL (ref 32.6–36.4)
MCV RBC AUTO: 101 FL (ref 79.8–96.2)
PLATELET # BLD AUTO: 206 10*3/MM3 (ref 140–500)
PMV BLD AUTO: 11 FL (ref 6–12)
POTASSIUM BLD-SCNC: 3.7 MMOL/L (ref 3.5–5.2)
PROT SERPL-MCNC: 7.6 G/DL (ref 6–8.5)
PROTHROMBIN TIME: 14.5 SECONDS (ref 11.7–14.2)
RBC # BLD AUTO: 3.96 10*6/MM3 (ref 4.6–6)
SODIUM BLD-SCNC: 134 MMOL/L (ref 136–145)
WBC NRBC COR # BLD: 8.16 10*3/MM3 (ref 4.5–10.7)

## 2018-02-27 PROCEDURE — 93005 ELECTROCARDIOGRAM TRACING: CPT

## 2018-02-27 PROCEDURE — 85610 PROTHROMBIN TIME: CPT | Performed by: SURGERY

## 2018-02-27 PROCEDURE — 85730 THROMBOPLASTIN TIME PARTIAL: CPT | Performed by: SURGERY

## 2018-02-27 PROCEDURE — 93010 ELECTROCARDIOGRAM REPORT: CPT | Performed by: INTERNAL MEDICINE

## 2018-02-27 PROCEDURE — 71046 X-RAY EXAM CHEST 2 VIEWS: CPT

## 2018-02-27 PROCEDURE — 85027 COMPLETE CBC AUTOMATED: CPT | Performed by: SURGERY

## 2018-02-27 PROCEDURE — 80053 COMPREHEN METABOLIC PANEL: CPT | Performed by: SURGERY

## 2018-02-27 PROCEDURE — 36415 COLL VENOUS BLD VENIPUNCTURE: CPT

## 2018-02-27 RX ORDER — FOLIC ACID 1 MG/1
1 TABLET ORAL DAILY
COMMUNITY

## 2018-02-27 RX ORDER — ASCORBIC ACID, THIAMINE MONONITRATE,RIBOFLAVIN, NIACINAMIDE, PYRIDOXINE HYDROCHLORIDE, FOLIC ACID, CYANOCOBALAMIN, BIOTIN, CALCIUM PANTOTHENATE, 100; 1.5; 1.7; 20; 10; 1; 6000; 150000; 5 MG/1; MG/1; MG/1; MG/1; MG/1; MG/1; UG/1; UG/1; MG/1
1 CAPSULE, LIQUID FILLED ORAL DAILY
COMMUNITY

## 2018-03-07 ENCOUNTER — HOSPITAL ENCOUNTER (OUTPATIENT)
Facility: HOSPITAL | Age: 73
Setting detail: HOSPITAL OUTPATIENT SURGERY
Discharge: HOME | End: 2018-03-07
Attending: SURGERY | Admitting: SURGERY

## 2018-03-07 ENCOUNTER — ANESTHESIA EVENT (OUTPATIENT)
Dept: PERIOP | Facility: HOSPITAL | Age: 73
End: 2018-03-07

## 2018-03-07 ENCOUNTER — APPOINTMENT (OUTPATIENT)
Dept: GENERAL RADIOLOGY | Facility: HOSPITAL | Age: 73
End: 2018-03-07

## 2018-03-07 ENCOUNTER — ANESTHESIA (OUTPATIENT)
Dept: PERIOP | Facility: HOSPITAL | Age: 73
End: 2018-03-07

## 2018-03-07 VITALS
BODY MASS INDEX: 24.52 KG/M2 | DIASTOLIC BLOOD PRESSURE: 62 MMHG | TEMPERATURE: 97.8 F | RESPIRATION RATE: 16 BRPM | HEART RATE: 63 BPM | SYSTOLIC BLOOD PRESSURE: 160 MMHG | HEIGHT: 70 IN | OXYGEN SATURATION: 94 % | WEIGHT: 171.31 LBS

## 2018-03-07 PROBLEM — IMO0002 ENDOLEAK POST (EVAR) ENDOVASCULAR ANEURYSM REPAIR: Chronic | Status: ACTIVE | Noted: 2018-03-07

## 2018-03-07 LAB
ANION GAP SERPL CALCULATED.3IONS-SCNC: 13.4 MMOL/L
BUN BLD-MCNC: 36 MG/DL (ref 8–23)
BUN/CREAT SERPL: 9.1 (ref 7–25)
CALCIUM SPEC-SCNC: 9.5 MG/DL (ref 8.6–10.5)
CHLORIDE SERPL-SCNC: 97 MMOL/L (ref 98–107)
CO2 SERPL-SCNC: 29.6 MMOL/L (ref 22–29)
CREAT BLD-MCNC: 3.96 MG/DL (ref 0.76–1.27)
GFR SERPL CREATININE-BSD FRML MDRD: 15 ML/MIN/1.73
GLUCOSE BLD-MCNC: 140 MG/DL (ref 65–99)
POTASSIUM BLD-SCNC: 3.9 MMOL/L (ref 3.5–5.2)
SODIUM BLD-SCNC: 140 MMOL/L (ref 136–145)

## 2018-03-07 PROCEDURE — 25010000002 VANCOMYCIN PER 500 MG: Performed by: SURGERY

## 2018-03-07 PROCEDURE — C1887 CATHETER, GUIDING: HCPCS | Performed by: SURGERY

## 2018-03-07 PROCEDURE — C1894 INTRO/SHEATH, NON-LASER: HCPCS | Performed by: SURGERY

## 2018-03-07 PROCEDURE — C1769 GUIDE WIRE: HCPCS | Performed by: SURGERY

## 2018-03-07 PROCEDURE — 25010000002 FENTANYL CITRATE (PF) 100 MCG/2ML SOLUTION: Performed by: NURSE ANESTHETIST, CERTIFIED REGISTERED

## 2018-03-07 PROCEDURE — 25010000002 PROPOFOL 10 MG/ML EMULSION: Performed by: NURSE ANESTHETIST, CERTIFIED REGISTERED

## 2018-03-07 PROCEDURE — 25010000002 HYDROCORTISONE SODIUM SUCCINATE 100 MG RECONSTITUTED SOLUTION: Performed by: NURSE ANESTHETIST, CERTIFIED REGISTERED

## 2018-03-07 PROCEDURE — 25010000002 NEOSTIGMINE PER 0.5 MG: Performed by: NURSE ANESTHETIST, CERTIFIED REGISTERED

## 2018-03-07 PROCEDURE — 25010000002 DIPHENHYDRAMINE PER 50 MG: Performed by: NURSE ANESTHETIST, CERTIFIED REGISTERED

## 2018-03-07 PROCEDURE — 25010000002 ONDANSETRON PER 1 MG: Performed by: NURSE ANESTHETIST, CERTIFIED REGISTERED

## 2018-03-07 PROCEDURE — 25010000002 HEPARIN (PORCINE) PER 1000 UNITS: Performed by: SURGERY

## 2018-03-07 PROCEDURE — 25010000002 HEPARIN (PORCINE) PER 1000 UNITS: Performed by: NURSE ANESTHETIST, CERTIFIED REGISTERED

## 2018-03-07 PROCEDURE — 80048 BASIC METABOLIC PNL TOTAL CA: CPT | Performed by: SURGERY

## 2018-03-07 PROCEDURE — 25010000002 PHENYLEPHRINE PER 1 ML: Performed by: NURSE ANESTHETIST, CERTIFIED REGISTERED

## 2018-03-07 PROCEDURE — 25010000002 PROTAMINE SULFATE PER 10 MG: Performed by: NURSE ANESTHETIST, CERTIFIED REGISTERED

## 2018-03-07 RX ORDER — DIPHENHYDRAMINE HYDROCHLORIDE 50 MG/ML
12.5 INJECTION INTRAMUSCULAR; INTRAVENOUS
Status: DISCONTINUED | OUTPATIENT
Start: 2018-03-07 | End: 2018-03-07 | Stop reason: HOSPADM

## 2018-03-07 RX ORDER — SODIUM CHLORIDE, SODIUM LACTATE, POTASSIUM CHLORIDE, CALCIUM CHLORIDE 600; 310; 30; 20 MG/100ML; MG/100ML; MG/100ML; MG/100ML
9 INJECTION, SOLUTION INTRAVENOUS CONTINUOUS
Status: DISCONTINUED | OUTPATIENT
Start: 2018-03-07 | End: 2018-03-07 | Stop reason: HOSPADM

## 2018-03-07 RX ORDER — PROMETHAZINE HYDROCHLORIDE 25 MG/1
25 SUPPOSITORY RECTAL ONCE AS NEEDED
Status: DISCONTINUED | OUTPATIENT
Start: 2018-03-07 | End: 2018-03-07 | Stop reason: HOSPADM

## 2018-03-07 RX ORDER — ONDANSETRON 2 MG/ML
4 INJECTION INTRAMUSCULAR; INTRAVENOUS ONCE AS NEEDED
Status: DISCONTINUED | OUTPATIENT
Start: 2018-03-07 | End: 2018-03-07 | Stop reason: HOSPADM

## 2018-03-07 RX ORDER — GLYCOPYRROLATE 0.2 MG/ML
INJECTION INTRAMUSCULAR; INTRAVENOUS AS NEEDED
Status: DISCONTINUED | OUTPATIENT
Start: 2018-03-07 | End: 2018-03-07 | Stop reason: SURG

## 2018-03-07 RX ORDER — ROCURONIUM BROMIDE 10 MG/ML
INJECTION, SOLUTION INTRAVENOUS AS NEEDED
Status: DISCONTINUED | OUTPATIENT
Start: 2018-03-07 | End: 2018-03-07 | Stop reason: SURG

## 2018-03-07 RX ORDER — LIDOCAINE HYDROCHLORIDE 10 MG/ML
0.5 INJECTION, SOLUTION EPIDURAL; INFILTRATION; INTRACAUDAL; PERINEURAL ONCE AS NEEDED
Status: DISCONTINUED | OUTPATIENT
Start: 2018-03-07 | End: 2018-03-07 | Stop reason: HOSPADM

## 2018-03-07 RX ORDER — CEFAZOLIN SODIUM 2 G/100ML
2 INJECTION, SOLUTION INTRAVENOUS ONCE
Status: DISCONTINUED | OUTPATIENT
Start: 2018-03-07 | End: 2018-03-07

## 2018-03-07 RX ORDER — PROMETHAZINE HYDROCHLORIDE 25 MG/ML
12.5 INJECTION, SOLUTION INTRAMUSCULAR; INTRAVENOUS ONCE AS NEEDED
Status: DISCONTINUED | OUTPATIENT
Start: 2018-03-07 | End: 2018-03-07 | Stop reason: HOSPADM

## 2018-03-07 RX ORDER — PROPOFOL 10 MG/ML
VIAL (ML) INTRAVENOUS AS NEEDED
Status: DISCONTINUED | OUTPATIENT
Start: 2018-03-07 | End: 2018-03-07 | Stop reason: SURG

## 2018-03-07 RX ORDER — HYDROMORPHONE HCL 110MG/55ML
0.5 PATIENT CONTROLLED ANALGESIA SYRINGE INTRAVENOUS
Status: DISCONTINUED | OUTPATIENT
Start: 2018-03-07 | End: 2018-03-07 | Stop reason: HOSPADM

## 2018-03-07 RX ORDER — FENTANYL CITRATE 50 UG/ML
50 INJECTION, SOLUTION INTRAMUSCULAR; INTRAVENOUS
Status: DISCONTINUED | OUTPATIENT
Start: 2018-03-07 | End: 2018-03-07 | Stop reason: HOSPADM

## 2018-03-07 RX ORDER — PROMETHAZINE HYDROCHLORIDE 25 MG/1
25 TABLET ORAL ONCE AS NEEDED
Status: DISCONTINUED | OUTPATIENT
Start: 2018-03-07 | End: 2018-03-07 | Stop reason: HOSPADM

## 2018-03-07 RX ORDER — HEPARIN SODIUM 1000 [USP'U]/ML
INJECTION, SOLUTION INTRAVENOUS; SUBCUTANEOUS AS NEEDED
Status: DISCONTINUED | OUTPATIENT
Start: 2018-03-07 | End: 2018-03-07 | Stop reason: SURG

## 2018-03-07 RX ORDER — LIDOCAINE HYDROCHLORIDE 20 MG/ML
INJECTION, SOLUTION INFILTRATION; PERINEURAL AS NEEDED
Status: DISCONTINUED | OUTPATIENT
Start: 2018-03-07 | End: 2018-03-07 | Stop reason: SURG

## 2018-03-07 RX ORDER — PROMETHAZINE HYDROCHLORIDE 25 MG/1
12.5 TABLET ORAL ONCE AS NEEDED
Status: DISCONTINUED | OUTPATIENT
Start: 2018-03-07 | End: 2018-03-07 | Stop reason: HOSPADM

## 2018-03-07 RX ORDER — MIDAZOLAM HYDROCHLORIDE 1 MG/ML
2 INJECTION INTRAMUSCULAR; INTRAVENOUS
Status: DISCONTINUED | OUTPATIENT
Start: 2018-03-07 | End: 2018-03-07 | Stop reason: HOSPADM

## 2018-03-07 RX ORDER — SODIUM CHLORIDE 9 MG/ML
9 INJECTION, SOLUTION INTRAVENOUS CONTINUOUS
Status: DISCONTINUED | OUTPATIENT
Start: 2018-03-07 | End: 2018-03-07 | Stop reason: HOSPADM

## 2018-03-07 RX ORDER — HYDROCODONE BITARTRATE AND ACETAMINOPHEN 7.5; 325 MG/1; MG/1
1 TABLET ORAL ONCE AS NEEDED
Status: DISCONTINUED | OUTPATIENT
Start: 2018-03-07 | End: 2018-03-07 | Stop reason: HOSPADM

## 2018-03-07 RX ORDER — FAMOTIDINE 10 MG/ML
20 INJECTION, SOLUTION INTRAVENOUS ONCE
Status: COMPLETED | OUTPATIENT
Start: 2018-03-07 | End: 2018-03-07

## 2018-03-07 RX ORDER — HYDRALAZINE HYDROCHLORIDE 20 MG/ML
5 INJECTION INTRAMUSCULAR; INTRAVENOUS
Status: DISCONTINUED | OUTPATIENT
Start: 2018-03-07 | End: 2018-03-07 | Stop reason: HOSPADM

## 2018-03-07 RX ORDER — MIDAZOLAM HYDROCHLORIDE 1 MG/ML
1 INJECTION INTRAMUSCULAR; INTRAVENOUS
Status: DISCONTINUED | OUTPATIENT
Start: 2018-03-07 | End: 2018-03-07 | Stop reason: HOSPADM

## 2018-03-07 RX ORDER — ONDANSETRON 2 MG/ML
INJECTION INTRAMUSCULAR; INTRAVENOUS AS NEEDED
Status: DISCONTINUED | OUTPATIENT
Start: 2018-03-07 | End: 2018-03-07 | Stop reason: SURG

## 2018-03-07 RX ORDER — EPHEDRINE SULFATE 50 MG/ML
5 INJECTION, SOLUTION INTRAVENOUS ONCE AS NEEDED
Status: DISCONTINUED | OUTPATIENT
Start: 2018-03-07 | End: 2018-03-07 | Stop reason: HOSPADM

## 2018-03-07 RX ORDER — HYDROCODONE BITARTRATE AND ACETAMINOPHEN 5; 325 MG/1; MG/1
1 TABLET ORAL EVERY 6 HOURS PRN
Qty: 30 TABLET | Refills: 0 | Status: SHIPPED | OUTPATIENT
Start: 2018-03-07 | End: 2018-04-12

## 2018-03-07 RX ORDER — DIPHENHYDRAMINE HYDROCHLORIDE 50 MG/ML
INJECTION INTRAMUSCULAR; INTRAVENOUS AS NEEDED
Status: DISCONTINUED | OUTPATIENT
Start: 2018-03-07 | End: 2018-03-07 | Stop reason: SURG

## 2018-03-07 RX ORDER — LABETALOL HYDROCHLORIDE 5 MG/ML
5 INJECTION, SOLUTION INTRAVENOUS
Status: DISCONTINUED | OUTPATIENT
Start: 2018-03-07 | End: 2018-03-07 | Stop reason: HOSPADM

## 2018-03-07 RX ORDER — SODIUM CHLORIDE 0.9 % (FLUSH) 0.9 %
1-10 SYRINGE (ML) INJECTION AS NEEDED
Status: DISCONTINUED | OUTPATIENT
Start: 2018-03-07 | End: 2018-03-07 | Stop reason: HOSPADM

## 2018-03-07 RX ORDER — VANCOMYCIN HYDROCHLORIDE 1 G/200ML
1000 INJECTION, SOLUTION INTRAVENOUS ONCE
Status: COMPLETED | OUTPATIENT
Start: 2018-03-07 | End: 2018-03-07

## 2018-03-07 RX ORDER — NALOXONE HCL 0.4 MG/ML
0.2 VIAL (ML) INJECTION AS NEEDED
Status: DISCONTINUED | OUTPATIENT
Start: 2018-03-07 | End: 2018-03-07 | Stop reason: HOSPADM

## 2018-03-07 RX ORDER — OXYCODONE AND ACETAMINOPHEN 7.5; 325 MG/1; MG/1
1 TABLET ORAL ONCE AS NEEDED
Status: DISCONTINUED | OUTPATIENT
Start: 2018-03-07 | End: 2018-03-07 | Stop reason: HOSPADM

## 2018-03-07 RX ORDER — FENTANYL CITRATE 50 UG/ML
INJECTION, SOLUTION INTRAMUSCULAR; INTRAVENOUS AS NEEDED
Status: DISCONTINUED | OUTPATIENT
Start: 2018-03-07 | End: 2018-03-07 | Stop reason: SURG

## 2018-03-07 RX ORDER — FLUMAZENIL 0.1 MG/ML
0.2 INJECTION INTRAVENOUS AS NEEDED
Status: DISCONTINUED | OUTPATIENT
Start: 2018-03-07 | End: 2018-03-07 | Stop reason: HOSPADM

## 2018-03-07 RX ORDER — EPHEDRINE SULFATE 50 MG/ML
INJECTION, SOLUTION INTRAVENOUS AS NEEDED
Status: DISCONTINUED | OUTPATIENT
Start: 2018-03-07 | End: 2018-03-07 | Stop reason: SURG

## 2018-03-07 RX ORDER — PROTAMINE SULFATE 10 MG/ML
INJECTION, SOLUTION INTRAVENOUS AS NEEDED
Status: DISCONTINUED | OUTPATIENT
Start: 2018-03-07 | End: 2018-03-07 | Stop reason: SURG

## 2018-03-07 RX ADMIN — ROCURONIUM BROMIDE 5 MG: 10 INJECTION INTRAVENOUS at 10:45

## 2018-03-07 RX ADMIN — ROCURONIUM BROMIDE 5 MG: 10 INJECTION INTRAVENOUS at 10:35

## 2018-03-07 RX ADMIN — HYDROCORTISONE SODIUM SUCCINATE 200 MG: 100 INJECTION, POWDER, FOR SOLUTION INTRAMUSCULAR; INTRAVENOUS at 08:20

## 2018-03-07 RX ADMIN — HEPARIN SODIUM 7500 UNITS: 1000 INJECTION, SOLUTION INTRAVENOUS; SUBCUTANEOUS at 08:39

## 2018-03-07 RX ADMIN — NEOSTIGMINE METHYLSULFATE 5 MG: 1 INJECTION INTRAMUSCULAR; INTRAVENOUS; SUBCUTANEOUS at 11:09

## 2018-03-07 RX ADMIN — EPHEDRINE SULFATE 10 MG: 50 INJECTION INTRAMUSCULAR; INTRAVENOUS; SUBCUTANEOUS at 08:52

## 2018-03-07 RX ADMIN — SODIUM CHLORIDE 9 ML/HR: 9 INJECTION, SOLUTION INTRAVENOUS at 07:10

## 2018-03-07 RX ADMIN — FENTANYL CITRATE 50 MCG: 50 INJECTION INTRAMUSCULAR; INTRAVENOUS at 08:52

## 2018-03-07 RX ADMIN — Medication 20 MG: at 07:53

## 2018-03-07 RX ADMIN — ROCURONIUM BROMIDE 40 MG: 10 INJECTION INTRAVENOUS at 08:07

## 2018-03-07 RX ADMIN — EPHEDRINE SULFATE 10 MG: 50 INJECTION INTRAMUSCULAR; INTRAVENOUS; SUBCUTANEOUS at 08:50

## 2018-03-07 RX ADMIN — LIDOCAINE HYDROCHLORIDE 100 MG: 20 INJECTION, SOLUTION INFILTRATION; PERINEURAL at 08:07

## 2018-03-07 RX ADMIN — HEPARIN SODIUM 2500 UNITS: 1000 INJECTION, SOLUTION INTRAVENOUS; SUBCUTANEOUS at 10:13

## 2018-03-07 RX ADMIN — PHENYLEPHRINE HYDROCHLORIDE 100 MCG: 10 INJECTION INTRAVENOUS at 10:14

## 2018-03-07 RX ADMIN — PHENYLEPHRINE HYDROCHLORIDE 100 MCG: 10 INJECTION INTRAVENOUS at 10:52

## 2018-03-07 RX ADMIN — FENTANYL CITRATE 25 MCG: 50 INJECTION INTRAMUSCULAR; INTRAVENOUS at 10:59

## 2018-03-07 RX ADMIN — GLYCOPYRROLATE 0.8 MG: 0.2 INJECTION INTRAMUSCULAR; INTRAVENOUS at 11:09

## 2018-03-07 RX ADMIN — DIPHENHYDRAMINE HYDROCHLORIDE 50 MG: 50 INJECTION INTRAMUSCULAR; INTRAVENOUS at 08:20

## 2018-03-07 RX ADMIN — PROPOFOL 150 MG: 10 INJECTION, EMULSION INTRAVENOUS at 08:07

## 2018-03-07 RX ADMIN — ONDANSETRON 4 MG: 2 INJECTION INTRAMUSCULAR; INTRAVENOUS at 10:56

## 2018-03-07 RX ADMIN — ROCURONIUM BROMIDE 10 MG: 10 INJECTION INTRAVENOUS at 09:38

## 2018-03-07 RX ADMIN — VANCOMYCIN HYDROCHLORIDE 1000 MG: 1 INJECTION, SOLUTION INTRAVENOUS at 07:10

## 2018-03-07 RX ADMIN — ROCURONIUM BROMIDE 10 MG: 10 INJECTION INTRAVENOUS at 09:12

## 2018-03-07 RX ADMIN — SODIUM CHLORIDE: 9 INJECTION, SOLUTION INTRAVENOUS at 08:00

## 2018-03-07 RX ADMIN — PROTAMINE SULFATE 40 MG: 10 INJECTION, SOLUTION INTRAVENOUS at 11:03

## 2018-03-07 RX ADMIN — FENTANYL CITRATE 50 MCG: 50 INJECTION INTRAMUSCULAR; INTRAVENOUS at 08:05

## 2018-03-07 RX ADMIN — FENTANYL CITRATE 50 MCG: 50 INJECTION INTRAMUSCULAR; INTRAVENOUS at 10:34

## 2018-03-07 RX ADMIN — PROPOFOL 30 MG: 10 INJECTION, EMULSION INTRAVENOUS at 10:34

## 2018-03-07 RX ADMIN — PROPOFOL 20 MG: 10 INJECTION, EMULSION INTRAVENOUS at 08:52

## 2018-03-07 NOTE — ANESTHESIA PREPROCEDURE EVALUATION
Anesthesia Evaluation     Patient summary reviewed and Nursing notes reviewed   no history of anesthetic complications:  NPO Solid Status: > 6 hours             Airway   Mallampati: III  TM distance: >3 FB  Neck ROM: full  No difficulty expected  Dental    (+) upper dentures    Pulmonary - normal exam   (+) a smoker Former,   Cardiovascular - normal exam    ECG reviewed    (+) hypertension, past MI , CABG, PVD,   (-) angina    ROS comment: AAA endo leak found on routine exam    Neuro/Psych  (+) TIA,     GI/Hepatic/Renal/Endo      Musculoskeletal     Abdominal    Substance History      OB/GYN          Other                      Anesthesia Plan    ASA 3     general   (LABS today pending)  Anesthetic plan and risks discussed with patient.

## 2018-03-07 NOTE — ANESTHESIA PROCEDURE NOTES
Airway  Urgency: elective    Airway not difficult    General Information and Staff    Patient location during procedure: OR  Anesthesiologist: LESLIE DAVIDSON  CRNA: REJI CHAVEZ    Indications and Patient Condition  Indications for airway management: airway protection    Preoxygenated: yes  Mask difficulty assessment: 2 - vent by mask + OA or adjuvant +/- NMBA    Final Airway Details  Final airway type: endotracheal airway      Successful airway: ETT  Cuffed: yes   Successful intubation technique: direct laryngoscopy  Facilitating devices/methods: intubating stylet  Endotracheal tube insertion site: oral  Blade: Gayatri  Blade size: #3  ETT size: 7.0 mm  Cormack-Lehane Classification: grade IIa - partial view of glottis  Placement verified by: chest auscultation and capnometry   Cuff volume (mL): 7  Measured from: lips  ETT to lips (cm): 21  Number of attempts at approach: 1    Additional Comments  Preoxygenated with 100% FiO2. Smooth IV induction. Atraumatic insertion. No change to dentition or soft tissue.

## 2018-03-07 NOTE — OP NOTE
Brief op note    03/07/18   Jarvis Lovell MD      Preoperative Diagnosis: Abdominal aortic aneurysm status post endovascular repair with type II endoleak and expansion of aortic sac    Postoperative Diagnosis: same as above    Procedure Performed: #1 left axillary artery cutdown.  #2 abdominal aortogram.  #3 superior mesenteric arteriogram    Surgeon: Jarvis Lovell MD    Assistant: Isaac Lal MD    Anesthesia: General Anesthesia via Endotracheal Tube    Estimated Blood Loss: 150    Specimen: None    Complications: None    Dispo: awakened from anesthesia, extubated and taken to the recovery room in a stable condition, having suffered no apparent untoward event.    Indication for procedure: 72 y.o. male status post an abdominal aortic aneurysm repair with a Casscoe stent graft.  He has had expansion of his aneurysmal sac due to type II endoleak over the last couple months.  He is brought for an endovascular intervention with the planned coil embolization of his inferior mesenteric artery via the superior mesenteric artery branch.    Angiographic findings: Abdominal aorta patent without stenosis.  Superior mesenteric artery with less than 50% stenosis at the origin.  Celiac artery with 2% stenosis at the origin.  Arc of Riolan seen without definite filling of the aneurysmal sac    Description of procedure: The patient was taken the operating room and placed in the supine position.  The left arm was extended on an arm board.  General anesthesia was induced.  Timeout was observed.  Preoperative antibiotics were given.  A longitudinal incision was made in the left arm over the axillary artery and this was dissected out for a couple centimeters and surrounded with a vessel loop.  The patient was systemically heparinized with 7500 units of heparin.  The artery was accessed with a micropuncture which was upsized to a 5 Frisian sheath.  A pigtail was used to obtain access to the abdominal aorta.  The  fluoroscopy was placed at 78° SANTOS and a angiographic run was performed that showed the above listed findings.  A 6 Romansh 45 cm sheath was placed.  The superior mesenteric artery was accessed and a glide catheter was placed in it.  An arteriogram was performed through the Glidecath.  The middle colic artery was seen filling the arc of Riolan.multiple attempts were made to cannulate this artery which was more than 50% stenotic at its origin.  A combination of 0.035 inch glide wires and multiple catheters was used.  A Progreat The wire and catheter were used as well and this was unsuccessful.  Finally, a 0.010 inch wire was used with multiple catheters and we were still not able to cannulate this.  At this point nearly 1 hour of fluoroscopy time had elapsed.  I did not feel at this case was going to be treatable through the SMA branch due to the multiple turns and inability to cannulate this artery.  The procedure was terminated.  The patient had received a total of 10,000 units of heparin.  Vascular clamps were carefully applied to the axillary artery as the sheath was removed and the axillary artery was backbled and forward flushed appropriately.  The access site was closed with 6-0 Prolene.  The radial artery had a strong signal after closure.  Incision was made hemostatic, irrigated, and then closed using 3-0 Vicryl and skin staples.  40 mg of protamine was administered after the arteriotomy was hemostatic.          Jarvis Lovell MD  03/07/18

## 2018-03-07 NOTE — ANESTHESIA POSTPROCEDURE EVALUATION
"Patient: Lukasz Flynn    Procedure Summary     Date Anesthesia Start Anesthesia Stop Room / Location    03/07/18 0800 1130  [facility] OR 18 INV /  [facility] OR 18/19       Procedure Diagnosis Provider Provider    LEFT BRACHIAL CUTDOWN SMA ARTERIOGRAM (N/A ) No diagnosis on file. MD Lukasz Montes MD          Anesthesia Type: general  Last vitals  BP   159/60 (03/07/18 1205)   Temp   36.6 °C (97.8 °F) (03/07/18 1128)   Pulse   62 (03/07/18 1205)   Resp   16 (03/07/18 1205)     SpO2   94 % (03/07/18 1205)     Post Anesthesia Care and Evaluation    Patient location during evaluation: PHASE II  Patient participation: complete - patient participated  Level of consciousness: awake and alert  Pain management: adequate  Airway patency: patent  Anesthetic complications: No anesthetic complications    Cardiovascular status: acceptable  Respiratory status: acceptable  Hydration status: acceptable    Comments: /60  Pulse 62  Temp 36.6 °C (97.8 °F) (Oral)   Resp 16  Ht 177.8 cm (70\")  Wt 77.7 kg (171 lb 5 oz)  SpO2 94%  BMI 24.58 kg/m2      "

## 2018-03-07 NOTE — DISCHARGE INSTRUCTIONS
[facility] DISCHARGE INSTRUCTIONS.    IMPORTANT: The following information will help you return to your best level of health.  GENERAL ANESTHESIA.  You have had general anesthesia. You were given a medicine to help you go to sleep and not feel pain.    Follow these instructions:   Go right home. Rest quietly at home today, then you can be up and about.   Do not drink alcohol, drive or operate machinery for 24 hours.   Do not make any important decisions or sign any legal papers in the next 24 hours.   Have a RESPONSIBLE PERSON stay with you the rest of today and overnight for your protection and safety.   Start your diet with fluids and light foods (jello, soup, juice, toast). Then eat a normal diet if not nauseated.    Call your doctor if you have:   any blue or gray skin color.   repeated vomiting.   trouble breathing  any new problems or concerns.    [facility]  Deshawn Christianson, Daryl, Mary Mcgarry Scherrer, [place]  [address] [place] [address]  [phone]      Discharge Instructions for Angiogram    1. Go home, rest and take it easy today.      2. You may experience some dizziness or memory loss from the anesthesia.  This may last for the next 24 hours.  Someone should plan on staying with you for the first 24 hours for your safety.    3. Do not make any important legal decisions or sign any legal papers for the next 24 hours.      4. Eat and drink lightly today.  Start off with liquids, jello, soup, crackers or other bland foods at first. You may advance your diet tomorrow as tolerated as long as you do not experience any nausea or vomiting.    5. You may resume routine medications including blood thinners. However, Glucophage should be not be started for 72 hours after the dye is given.      6. No lifting over 10 pounds and no strenuous activity for the next 2-3 days.    7. Try not to strain or bear down when your bowels move or when you empty your  bladder.    8. Limit going up and down steps for 2 days.    9. No driving for the remainder of the day.  You may resume limited driving tomorrow if necessary.     10.  You may shower tomorrow.  No bath or hot tubs for at least 2 days after the procedure.          11. Leave the pressure dressing on until tomorrow morning.  You may then take this off, as well as the small see through dressing with gauze tomorrow.  If it doesn’t come off easily, do not pull this off.  If it is stuck, shower and let the warm water loosen it before removal.       12. Check your groin dressing regularly for bleeding through the dressing (under the pressure dressing).  A small amount of blood contained by the gauze is normal; the whole dressing should not be filled with blood or leaking out under the sides.     13. If you experience bleeding through the gauze/clear sticky dressing, lay flat and have someone apply direct pressure for 15 minutes.  If bleeding has stopped after this, you may put a clean gauze and tape over the area.  Continue to lie flat for 1-2 hours.  If bleeding continues after 15 minutes of pressure, call us at the number listed above.  There is an MD available after hours.      14. If you experience heavy bleeding or large swelling, continue to hold firm pressure and              call 911.  Do not call the MD on call.     15.  If you experience pain or discomfort you may take Tylenol or Ibuprofen, whichever you normally use for minor discomfort, unless otherwise instructed.       16.  If the MD gives you a prescription for narcotic pain pills (Tylenol #3, Vicodin, Hydrocodone, Oxycodone or Percocet), you cannot drive a vehicle or operate machinery while taking these.    17.  Severe pain is not expected after this procedure.  If you experience severe pain, please call our office at [phone].    18.  Remember to contact our office for any of the following:    • Fever > 101 degrees  • Severe pain that cannot be controlled  by taking your pain pills  • Severe nausea or vomiting   • Significant bleeding of your incisions  • Drainage that has a bad smell or is yellow or green in appearance  Any other questions or concerns

## 2018-03-07 NOTE — PLAN OF CARE
Problem: Patient Care Overview (Adult)  Goal: Plan of Care Review  Outcome: Outcome(s) achieved Date Met: 03/07/18 03/07/18 1224   Coping/Psychosocial Response Interventions   Plan Of Care Reviewed With patient;spouse   Patient Care Overview   Progress improving   Outcome Evaluation   Outcome Summary/Follow up Plan ready for discharge       03/07/18 1224   Coping/Psychosocial Response Interventions   Plan Of Care Reviewed With patient;spouse   Patient Care Overview   Progress improving   Outcome Evaluation   Outcome Summary/Follow up Plan ready for discharge      Goal: Adult Individualization and Mutuality  Outcome: Outcome(s) achieved Date Met: 03/07/18    Goal: Discharge Needs Assessment  Outcome: Outcome(s) achieved Date Met: 03/07/18 03/07/18 1224   Discharge Needs Assessment   Concerns To Be Addressed no discharge needs identified       Problem: Perioperative Period (Adult)  Goal: Signs and Symptoms of Listed Potential Problems Will be Absent or Manageable (Perioperative Period)  Outcome: Outcome(s) achieved Date Met: 03/07/18 03/07/18 1224   Perioperative Period   Problems Assessed (Perioperative Period) all   Problems Present (Perioperative Period) none

## 2018-04-02 ENCOUNTER — TRANSCRIBE ORDERS (OUTPATIENT)
Dept: ADMINISTRATIVE | Facility: HOSPITAL | Age: 73
End: 2018-04-02

## 2018-04-02 DIAGNOSIS — I71.40 ABDOMINAL AORTIC ANEURYSM (AAA) WITHOUT RUPTURE (HCC): Primary | ICD-10-CM

## 2018-04-12 ENCOUNTER — OFFICE VISIT (OUTPATIENT)
Dept: CARDIOLOGY | Facility: CLINIC | Age: 73
End: 2018-04-12

## 2018-04-12 VITALS
DIASTOLIC BLOOD PRESSURE: 60 MMHG | SYSTOLIC BLOOD PRESSURE: 130 MMHG | HEART RATE: 62 BPM | BODY MASS INDEX: 24.62 KG/M2 | HEIGHT: 70 IN | WEIGHT: 172 LBS

## 2018-04-12 DIAGNOSIS — I65.29 STENOSIS OF CAROTID ARTERY, UNSPECIFIED LATERALITY: ICD-10-CM

## 2018-04-12 DIAGNOSIS — I25.10 CORONARY ARTERIOSCLEROSIS IN NATIVE ARTERY: Primary | ICD-10-CM

## 2018-04-12 DIAGNOSIS — R09.89 BRUIT OF LEFT CAROTID ARTERY: ICD-10-CM

## 2018-04-12 DIAGNOSIS — E78.2 MIXED HYPERLIPIDEMIA: ICD-10-CM

## 2018-04-12 DIAGNOSIS — I10 ESSENTIAL HYPERTENSION: ICD-10-CM

## 2018-04-12 PROCEDURE — 99213 OFFICE O/P EST LOW 20 MIN: CPT | Performed by: INTERNAL MEDICINE

## 2018-04-12 PROCEDURE — 93000 ELECTROCARDIOGRAM COMPLETE: CPT | Performed by: INTERNAL MEDICINE

## 2018-04-12 RX ORDER — SIMVASTATIN 40 MG
40 TABLET ORAL NIGHTLY
COMMUNITY
End: 2022-03-14

## 2018-04-12 NOTE — PROGRESS NOTES
PATIENTINFORMATION    Date of Office Visit: 2018  Encounter Provider: Jane Rider MD  Place of Service: [facility] CARDIOLOGY  Patient Name: Lukasz Flynn  : 1945    Subjective:     Encounter Date:2018      Patient ID: Lukasz Flynn is a 72 y.o. male.      History of Present Illness    The patient is a 72 y.o. male who I have been following when he comes in for visits since the winter of 2011. I initially saw him for an abnormal EKG. A stress test in 2011 showed a diminished ejection fraction at 40-45% with left ventricular hypertrophy. The stress test was abnormal which led to a heart catheterization . This showed significant three vessel coronary disease. While in the hospital in 2011 for his heart catheterization, he was found to have significant carotid disease. An endovascular attempt to stent his carotid was unsuccessful. In 2011 he had a left carotid endarterectomy. In 2011 he was admitted with renal failure which required dialysis. 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 After he left the hospital in the spring of 2011 he failed to follow up with me. He did followup with his nephrologist and was placed on hemodialysis.      I saw him again in the hospital in the spring of 2012. At this time, he was admitted with MRSA bacteremia and a non-ST elevation myocardial infarction. Repeat heart catheterization  continued to show significant three vessel disease. A repeat transesophageal echocardiogram on 2012 showed no evidence of endocarditis, but he did have a small patent foramen ovale and significant atherosclerotic disease throughout the entire aorta with multiple mobile plaques identified. He came back to see me in the office in 2012 and was doing quite well. I had him followup with Dr. Anthony to schedule his bypass surgery. He was scheduled for  but presented on   30th with chest pain and a non-ST elevation myocardial infarction. He emergently went to the operating room on July 1st where he had a four vessel bypass including a LIMA to the LAD, vein graft to the right coronary artery, vein graft to the first marginal branch of the circumflex and vein graft to the second marginal branch of the circumflex. 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 Overall, his postoperative course was unremarkable. I saw him in August 2012 and at that time he was doing well.     I saw him in 04/2015, which was about three years after his last appointment. At that time, he was complaining of noticing some spots in his eyes and some of his field of vision being obscured and brown. He has had a left carotid endarterectomy. His right carotid has apparently been stable at 60% and he follows with one of the vascular surgeons. I ordered a 48-hour Holter monitor 4/15, which was normal. I ordered an Echocardiogram 4/15, which showed left ventricular systolic function with ejection fraction of 57%, stage I-A diastolic dysfunction, mild left atrial enlargement, and no significant valvular heart disease. He also had an MRI of the brain, which showed no acute infarct.  There were tiny old infarcts noted as well as some hemosiderin deposits likely from old microhemorrhages in the past. He was supposed to follow up with me but did not.  Before Christmas 2015, he was on a chair trying to fix a clock and the chair slipped and he fell and broke his left wrist. He came to see me in January 2016 for preoperative clearance for her prescription wrist surgery.     He comes in today for his yearly visit.  He has been feeling well from a cardiac standpoint.  He denies chest pain, shortness of breath or edema.  He has no palpitations.  He has not been exercising just because he doesn't feel like it.  He reports that he is having a problem with his abdominal aortic aneurysm.  Dr. Ge  "apparently attempted to fix it but did not.  He has another scan planned in 6 months and may need another procedure.    Review of Systems   Constitution: Negative for fever, malaise/fatigue, weight gain and weight loss.   HENT: Negative for ear pain, hearing loss, nosebleeds and sore throat.    Eyes: Negative for double vision, pain, vision loss in left eye and vision loss in right eye.   Cardiovascular:        See history of present illness.   Respiratory: Negative for cough, shortness of breath, sleep disturbances due to breathing, snoring and wheezing.    Endocrine: Negative for cold intolerance, heat intolerance and polyuria.   Skin: Negative for itching, poor wound healing and rash.   Musculoskeletal: Negative for joint pain, joint swelling and myalgias.   Gastrointestinal: Negative for abdominal pain, diarrhea, hematochezia, nausea and vomiting.   Genitourinary: Negative for hematuria and hesitancy.   Neurological: Negative for numbness, paresthesias and seizures.   Psychiatric/Behavioral: Negative for depression. The patient is not nervous/anxious.            ECG 12 Lead  Date/Time: 4/12/2018 10:50 AM  Performed by: JOSE EDGAR  Authorized by: JOSE EDGAR   Comparison: compared with previous ECG from 2/27/2018  Similar to previous ECG  Rhythm: sinus rhythm  BPM: 62  Conduction: conduction normal  Other findings: LVH with strain  Clinical impression: abnormal ECG               Objective:     /60 (BP Location: Left arm)   Pulse 62   Ht 177.8 cm (70\")   Wt 78 kg (172 lb)   BMI 24.68 kg/m²  Body mass index is 24.68 kg/m².     Physical Exam   Constitutional: He appears well-developed.   HENT:   Head: Normocephalic and atraumatic.   Eyes: Conjunctivae and lids are normal. Pupils are equal, round, and reactive to light. Lids are everted and swept, no foreign bodies found.   Neck: Normal range of motion. No JVD present. Carotid bruit is not present. No tracheal deviation present. No thyroid mass " present.   Cardiovascular: Normal rate, regular rhythm and normal heart sounds.    Pulses:       Dorsalis pedis pulses are 2+ on the right side, and 2+ on the left side.   Pulmonary/Chest: Effort normal and breath sounds normal.   Abdominal: Normal appearance and bowel sounds are normal.   Musculoskeletal: Normal range of motion.   Neurological: He is alert. He has normal strength.   Skin: Skin is warm, dry and intact.   Psychiatric: He has a normal mood and affect. His behavior is normal.   Vitals reviewed.          Assessment/Plan:       1. Coronary Artery Disease  Assessment  • The patient has no angina    Plan  • Lifestyle modifications discussed include medication compliance, regular exercise and regular monitoring of cholesterol and blood pressure    Subjective - Objective  • There is a history of previous coronary artery bypass graft on or around 7/1/2012  • Current antiplatelet therapy includes aspirin 81 mg    2. History of abnormal left ventricular systolic function. His last ejection fraction was normal at 57% but he did have some anterolateral hypokinesis. He is on a beta-blocker.   3. Peripheral vascular disease status post left carotid endarterectomy. He follows with Dr. Lovell.    4. Infra-abdominal aortic aneurysm s/p stent graft. followed by .   5. Hypertension controlled.  His blood pressures a little bit high.  I don't want to adjust his medicines in case he starts having hypotension with dialysis.   6. Hyperlipidemia.  It has been 2 years since his last lipid panel.  I put in orders for one today.  He has not fasting so he will do it next week.  I want his LDL to be less than 70 given his extensive atheromatous burden.  He has never tried Lipitor or Crestor.  7. History of pulmonary nodules.   8. End-stage renal disease. He is on hemodialysis.   9. History of stroke.   10. History of poor nutrition with a history of G-tube but he is no longer having issues with that.   11. History of  Methicillin-resistant Staphylococcus aureus bacteremia.      I encouraged regular exercise. I will see him back in one year unless he has problems sooner.    Orders Placed This Encounter   Procedures   • ECG 12 Lead     This order was created via procedure documentation      Lukasz Flynn   Home Medication Instructions MARIBEL:    Printed on:04/12/18 9900   Medication Information                      aspirin 81 MG tablet  Take 81 mg by mouth Daily.             B Complex-C-Folic Acid (RENAL) 1 MG capsule capsule  Take 1 capsule by mouth Daily.             folic acid (FOLVITE) 1 MG tablet  Take 1 mg by mouth Daily.             Lactobacillus (ACIDOPHILUS) 100 MG capsule  Take 1 tablet by mouth daily.             metoprolol succinate XL (TOPROL-XL) 25 MG 24 hr tablet  Take 25 mg by mouth every night.             simvastatin (ZOCOR) 40 MG tablet  Take 40 mg by mouth Every Night.                        Jane Rider MD  04/12/18  10:53 AM

## 2018-04-13 ENCOUNTER — TRANSCRIBE ORDERS (OUTPATIENT)
Dept: ADMINISTRATIVE | Facility: HOSPITAL | Age: 73
End: 2018-04-13

## 2018-04-13 DIAGNOSIS — H53.2 DOUBLE VISION: Primary | ICD-10-CM

## 2018-04-20 ENCOUNTER — APPOINTMENT (OUTPATIENT)
Dept: MRI IMAGING | Facility: HOSPITAL | Age: 73
End: 2018-04-20
Attending: SPECIALIST

## 2018-04-20 ENCOUNTER — HOSPITAL ENCOUNTER (OUTPATIENT)
Dept: MRI IMAGING | Facility: HOSPITAL | Age: 73
End: 2018-04-20
Attending: SPECIALIST

## 2018-04-24 ENCOUNTER — APPOINTMENT (OUTPATIENT)
Dept: MRI IMAGING | Facility: HOSPITAL | Age: 73
End: 2018-04-24
Attending: SPECIALIST

## 2018-04-24 ENCOUNTER — HOSPITAL ENCOUNTER (OUTPATIENT)
Dept: MRI IMAGING | Facility: HOSPITAL | Age: 73
Discharge: HOME | End: 2018-04-24
Attending: SPECIALIST | Admitting: SPECIALIST

## 2018-04-24 DIAGNOSIS — H53.2 DOUBLE VISION: ICD-10-CM

## 2018-04-24 PROCEDURE — 82565 ASSAY OF CREATININE: CPT

## 2018-04-24 PROCEDURE — 70551 MRI BRAIN STEM W/O DYE: CPT

## 2018-04-25 LAB — CREAT BLDA-MCNC: 3.3 MG/DL (ref 0.6–1.3)

## 2018-07-02 ENCOUNTER — OFFICE VISIT (OUTPATIENT)
Dept: FAMILY MEDICINE CLINIC | Facility: CLINIC | Age: 73
End: 2018-07-02

## 2018-07-02 VITALS
WEIGHT: 174 LBS | OXYGEN SATURATION: 96 % | SYSTOLIC BLOOD PRESSURE: 120 MMHG | RESPIRATION RATE: 16 BRPM | HEIGHT: 70 IN | TEMPERATURE: 98 F | HEART RATE: 66 BPM | DIASTOLIC BLOOD PRESSURE: 60 MMHG | BODY MASS INDEX: 24.91 KG/M2

## 2018-07-02 DIAGNOSIS — R49.0 HOARSENESS, PERSISTENT: Primary | ICD-10-CM

## 2018-07-02 DIAGNOSIS — Z12.2 ENCOUNTER FOR SCREENING FOR LUNG CANCER: ICD-10-CM

## 2018-07-02 PROCEDURE — 99213 OFFICE O/P EST LOW 20 MIN: CPT | Performed by: NURSE PRACTITIONER

## 2018-07-02 NOTE — PROGRESS NOTES
Patient ID: Lukasz Flynn is a 72 y.o. male     Subjective     Chief Complaint   Patient presents with   • Hoarse       History of Present Illness    Lukasz Flynn is a patient of Dr. Quiñones's who presents to the office today for evaluation concerning hoarseness. He had problems with chronic cough which started the beginning of the year.  He was treated with antibiotics and cough medication.  His cough eventually went away, however he has had continued hoarseness since then.  He denies any other complaints such as  fever, chills, cough, hemoptysis, chest pain, shortness of air,  Unintentional weight loss or any other concerns. He is a former smoker of approximately 1 ppd for 50 years.  Quit in 2012    The following portions of the patient's history were reviewed and updated as appropriate: allergies, current medications, past family history, past medical history, past social history, past surgical history and problem list.       Review of Systems   Constitution: Negative.   HENT: Positive for hoarse voice.    Eyes: Negative.    Cardiovascular: Negative.    Respiratory: Negative.    Endocrine: Negative.    Hematologic/Lymphatic: Negative.    Skin: Negative.    Musculoskeletal: Negative.    Gastrointestinal: Negative.    Genitourinary: Negative.    Neurological: Negative.    Psychiatric/Behavioral: Negative.        Vitals:    07/02/18 1120   BP: 120/60   Pulse: 66   Resp: 16   Temp: 98 °F (36.7 °C)   SpO2: 96%       Results for orders placed or performed during the hospital encounter of 04/24/18   POC Creatinine   Result Value Ref Range    Creatinine 3.30 (H) 0.60 - 1.30 mg/dL       Objective     Physical Exam   Constitutional: He is oriented to person, place, and time. Vital signs are normal. He appears well-developed.   HENT:   Head: Normocephalic and atraumatic.   Right Ear: Tympanic membrane normal.   Left Ear: Tympanic membrane normal.   Mouth/Throat: Oropharynx is clear and moist.   Hoarse voice noted   Neck:  Normal range of motion. Neck supple.   Cardiovascular: Normal rate, regular rhythm, normal heart sounds and intact distal pulses.    No murmur heard.  Pulmonary/Chest: Effort normal and breath sounds normal. He has no wheezes. He has no rhonchi. He has no rales.   Musculoskeletal: Normal range of motion. He exhibits no edema or tenderness.   Neurological: He is alert and oriented to person, place, and time. He has normal strength.   Skin: Skin is warm and dry. No rash noted. No cyanosis or erythema.   Psychiatric: He has a normal mood and affect. His behavior is normal.          Assessment/Plan   Diagnoses and all orders for this visit:    Hoarseness, persistent  -     CT Chest Without Contrast; Future    Encounter for screening for lung cancer        Summary:  Lukasz Flynn has had problems with chronic hoarseness which have been present for at least 6 months.  He has no other symptoms.  He is a former smoker of 1 ppd for 50+ years.  Quit in 2012.  He has not had CT chest for lung cancer screening.  Will start with CT chest concerning his problems of hoarseness.  If CT normal, then referral to ENT will be provided.        Vivian Ragsdale, APRN  Family Medicine  [facility] [place]  07/02/18  12:40 PM

## 2018-07-11 ENCOUNTER — HOSPITAL ENCOUNTER (OUTPATIENT)
Dept: CT IMAGING | Facility: HOSPITAL | Age: 73
Discharge: HOME | End: 2018-07-11
Admitting: NURSE PRACTITIONER

## 2018-07-11 DIAGNOSIS — R49.0 HOARSENESS, PERSISTENT: ICD-10-CM

## 2018-07-11 PROCEDURE — 71250 CT THORAX DX C-: CPT

## 2018-07-13 ENCOUNTER — TELEPHONE (OUTPATIENT)
Dept: FAMILY MEDICINE CLINIC | Facility: CLINIC | Age: 73
End: 2018-07-13

## 2018-10-16 ENCOUNTER — HOSPITAL ENCOUNTER (OUTPATIENT)
Dept: CT IMAGING | Facility: HOSPITAL | Age: 73
Discharge: HOME | End: 2018-10-16
Attending: SURGERY | Admitting: SURGERY

## 2018-10-16 DIAGNOSIS — I71.40 ABDOMINAL AORTIC ANEURYSM (AAA) WITHOUT RUPTURE (HCC): ICD-10-CM

## 2018-10-16 LAB — CREAT BLDA-MCNC: 3.5 MG/DL (ref 0.6–1.3)

## 2018-10-16 PROCEDURE — 25010000002 IOPAMIDOL 61 % SOLUTION: Performed by: SURGERY

## 2018-10-16 PROCEDURE — 74174 CTA ABD&PLVS W/CONTRAST: CPT

## 2018-10-16 PROCEDURE — 82565 ASSAY OF CREATININE: CPT

## 2018-10-16 RX ADMIN — IOPAMIDOL 95 ML: 612 INJECTION, SOLUTION INTRAVENOUS at 10:02

## 2019-02-24 ENCOUNTER — APPOINTMENT (OUTPATIENT)
Dept: GENERAL RADIOLOGY | Facility: HOSPITAL | Age: 74
End: 2019-02-24

## 2019-02-24 ENCOUNTER — HOSPITAL ENCOUNTER (EMERGENCY)
Facility: HOSPITAL | Age: 74
Discharge: HOME | End: 2019-02-24
Attending: EMERGENCY MEDICINE | Admitting: EMERGENCY MEDICINE

## 2019-02-24 VITALS
SYSTOLIC BLOOD PRESSURE: 163 MMHG | TEMPERATURE: 98 F | DIASTOLIC BLOOD PRESSURE: 81 MMHG | WEIGHT: 190.8 LBS | RESPIRATION RATE: 16 BRPM | HEART RATE: 67 BPM | HEIGHT: 70 IN | BODY MASS INDEX: 27.32 KG/M2 | OXYGEN SATURATION: 94 %

## 2019-02-24 DIAGNOSIS — I10 PRIMARY HYPERTENSION: ICD-10-CM

## 2019-02-24 DIAGNOSIS — J81.0 ACUTE PULMONARY EDEMA (HCC): Primary | ICD-10-CM

## 2019-02-24 DIAGNOSIS — R06.02 SHORTNESS OF BREATH: ICD-10-CM

## 2019-02-24 DIAGNOSIS — I35.1 AORTIC VALVE INSUFFICIENCY, ETIOLOGY OF CARDIAC VALVE DISEASE UNSPECIFIED: ICD-10-CM

## 2019-02-24 LAB
ANION GAP SERPL CALCULATED.3IONS-SCNC: 14.2 MMOL/L
BASOPHILS # BLD AUTO: 0.04 10*3/MM3 (ref 0–0.2)
BASOPHILS NFR BLD AUTO: 0.5 % (ref 0–1.5)
BUN BLD-MCNC: 33 MG/DL (ref 8–23)
BUN/CREAT SERPL: 8.8 (ref 7–25)
CALCIUM SPEC-SCNC: 9.5 MG/DL (ref 8.6–10.5)
CHLORIDE SERPL-SCNC: 102 MMOL/L (ref 98–107)
CO2 SERPL-SCNC: 25.8 MMOL/L (ref 22–29)
CREAT BLD-MCNC: 3.73 MG/DL (ref 0.76–1.27)
DEPRECATED RDW RBC AUTO: 53.8 FL (ref 37–54)
EOSINOPHIL # BLD AUTO: 0.09 10*3/MM3 (ref 0–0.4)
EOSINOPHIL NFR BLD AUTO: 1.2 % (ref 0.3–6.2)
ERYTHROCYTE [DISTWIDTH] IN BLOOD BY AUTOMATED COUNT: 14.6 % (ref 12.3–15.4)
GFR SERPL CREATININE-BSD FRML MDRD: 16 ML/MIN/1.73
GLUCOSE BLD-MCNC: 165 MG/DL (ref 65–99)
HCT VFR BLD AUTO: 42.1 % (ref 37.5–51)
HGB BLD-MCNC: 13.8 G/DL (ref 13–17.7)
IMM GRANULOCYTES # BLD AUTO: 0.04 10*3/MM3 (ref 0–0.05)
IMM GRANULOCYTES NFR BLD AUTO: 0.5 % (ref 0–0.5)
LYMPHOCYTES # BLD AUTO: 1.38 10*3/MM3 (ref 0.7–3.1)
LYMPHOCYTES NFR BLD AUTO: 17.7 % (ref 19.6–45.3)
MCH RBC QN AUTO: 32.9 PG (ref 26.6–33)
MCHC RBC AUTO-ENTMCNC: 32.8 G/DL (ref 31.5–35.7)
MCV RBC AUTO: 100.2 FL (ref 79–97)
MONOCYTES # BLD AUTO: 0.66 10*3/MM3 (ref 0.1–0.9)
MONOCYTES NFR BLD AUTO: 8.5 % (ref 5–12)
NEUTROPHILS # BLD AUTO: 5.6 10*3/MM3 (ref 1.4–7)
NEUTROPHILS NFR BLD AUTO: 71.6 % (ref 42.7–76)
NRBC BLD AUTO-RTO: 0 /100 WBC (ref 0–0)
NT-PROBNP SERPL-MCNC: 7068 PG/ML (ref 0–900)
PLATELET # BLD AUTO: 166 10*3/MM3 (ref 140–450)
PMV BLD AUTO: 11.8 FL (ref 6–12)
POTASSIUM BLD-SCNC: 4.1 MMOL/L (ref 3.5–5.2)
RBC # BLD AUTO: 4.2 10*6/MM3 (ref 4.14–5.8)
SODIUM BLD-SCNC: 142 MMOL/L (ref 136–145)
TROPONIN T SERPL-MCNC: 0.02 NG/ML (ref 0–0.03)
WBC NRBC COR # BLD: 7.81 10*3/MM3 (ref 3.4–10.8)

## 2019-02-24 PROCEDURE — 71046 X-RAY EXAM CHEST 2 VIEWS: CPT

## 2019-02-24 PROCEDURE — 96374 THER/PROPH/DIAG INJ IV PUSH: CPT

## 2019-02-24 PROCEDURE — 99284 EMERGENCY DEPT VISIT MOD MDM: CPT

## 2019-02-24 PROCEDURE — 93005 ELECTROCARDIOGRAM TRACING: CPT | Performed by: EMERGENCY MEDICINE

## 2019-02-24 PROCEDURE — 80048 BASIC METABOLIC PNL TOTAL CA: CPT | Performed by: EMERGENCY MEDICINE

## 2019-02-24 PROCEDURE — 25010000002 FUROSEMIDE PER 20 MG: Performed by: EMERGENCY MEDICINE

## 2019-02-24 PROCEDURE — 93010 ELECTROCARDIOGRAM REPORT: CPT | Performed by: INTERNAL MEDICINE

## 2019-02-24 PROCEDURE — 83880 ASSAY OF NATRIURETIC PEPTIDE: CPT | Performed by: EMERGENCY MEDICINE

## 2019-02-24 PROCEDURE — 85025 COMPLETE CBC W/AUTO DIFF WBC: CPT | Performed by: EMERGENCY MEDICINE

## 2019-02-24 PROCEDURE — 93005 ELECTROCARDIOGRAM TRACING: CPT

## 2019-02-24 PROCEDURE — 84484 ASSAY OF TROPONIN QUANT: CPT | Performed by: EMERGENCY MEDICINE

## 2019-02-24 PROCEDURE — 96375 TX/PRO/DX INJ NEW DRUG ADDON: CPT

## 2019-02-24 RX ORDER — SODIUM CHLORIDE 0.9 % (FLUSH) 0.9 %
10 SYRINGE (ML) INJECTION AS NEEDED
Status: DISCONTINUED | OUTPATIENT
Start: 2019-02-24 | End: 2019-02-24 | Stop reason: HOSPADM

## 2019-02-24 RX ORDER — FUROSEMIDE 10 MG/ML
80 INJECTION INTRAMUSCULAR; INTRAVENOUS ONCE
Status: COMPLETED | OUTPATIENT
Start: 2019-02-24 | End: 2019-02-24

## 2019-02-24 RX ORDER — LABETALOL HYDROCHLORIDE 5 MG/ML
10 INJECTION, SOLUTION INTRAVENOUS ONCE
Status: COMPLETED | OUTPATIENT
Start: 2019-02-24 | End: 2019-02-24

## 2019-02-24 RX ADMIN — LABETALOL HYDROCHLORIDE 10 MG: 5 INJECTION, SOLUTION INTRAVENOUS at 11:31

## 2019-02-24 RX ADMIN — FUROSEMIDE 80 MG: 10 INJECTION, SOLUTION INTRAMUSCULAR; INTRAVENOUS at 12:21

## 2019-04-11 ENCOUNTER — OFFICE VISIT (OUTPATIENT)
Dept: CARDIOLOGY | Facility: CLINIC | Age: 74
End: 2019-04-11

## 2019-04-11 VITALS
BODY MASS INDEX: 25.91 KG/M2 | WEIGHT: 181 LBS | HEIGHT: 70 IN | DIASTOLIC BLOOD PRESSURE: 70 MMHG | SYSTOLIC BLOOD PRESSURE: 160 MMHG | RESPIRATION RATE: 16 BRPM | HEART RATE: 58 BPM

## 2019-04-11 DIAGNOSIS — I25.10 CORONARY ARTERIOSCLEROSIS IN NATIVE ARTERY: Primary | ICD-10-CM

## 2019-04-11 DIAGNOSIS — I10 ESSENTIAL HYPERTENSION: ICD-10-CM

## 2019-04-11 DIAGNOSIS — I65.29 STENOSIS OF CAROTID ARTERY, UNSPECIFIED LATERALITY: ICD-10-CM

## 2019-04-11 DIAGNOSIS — N18.6 END-STAGE RENAL DISEASE (HCC): ICD-10-CM

## 2019-04-11 DIAGNOSIS — I71.40 ABDOMINAL AORTIC ANEURYSM (AAA) WITHOUT RUPTURE (HCC): ICD-10-CM

## 2019-04-11 DIAGNOSIS — E78.2 MIXED HYPERLIPIDEMIA: ICD-10-CM

## 2019-04-11 PROCEDURE — 93000 ELECTROCARDIOGRAM COMPLETE: CPT | Performed by: INTERNAL MEDICINE

## 2019-04-11 PROCEDURE — 99214 OFFICE O/P EST MOD 30 MIN: CPT | Performed by: INTERNAL MEDICINE

## 2019-04-11 NOTE — PROGRESS NOTES
PATIENTINFORMATION    Date of Office Visit: 2019  Encounter Provider: Jane Rider MD  Place of Service: [facility] CARDIOLOGY  Patient Name: Lukasz Flynn  : 1945    Subjective:     Encounter Date:2019      Patient ID: Lukasz Flynn is a 73 y.o. male.      History of Present Illness    I initially saw him for an abnormal EKG in . A stress test in 2011 showed a diminished ejection fraction at 40-45% with left ventricular hypertrophy. The stress test was abnormal which led to a heart catheterization . This showed significant three vessel coronary disease. While in the hospital in 2011 for his heart catheterization, he was found to have significant carotid disease. An endovascular attempt to stent his carotid was unsuccessful. In 2011 he had a left carotid endarterectomy. In 2011 he was admitted with renal failure which required dialysis. 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 After he left the hospital in the spring of 2011 he failed to follow up with me. He did followup with his nephrologist and was placed on hemodialysis.      I saw him again in the hospital in the spring of 2012. At this time, he was admitted with MRSA bacteremia and a non-ST elevation myocardial infarction. Repeat heart catheterization  continued to show significant three vessel disease. A repeat transesophageal echocardiogram on 2012 showed no evidence of endocarditis, but he did have a small patent foramen ovale and significant atherosclerotic disease throughout the entire aorta with multiple mobile plaques identified. He came back to see me in the office in 2012 and was doing quite well. I had him followup with Dr. Anthony to schedule his bypass surgery. He was scheduled for  but presented on  with chest pain and a non-ST elevation myocardial infarction. He emergently went to the  operating room on July 1st where he had a four vessel bypass including a LIMA to the LAD, vein graft to the right coronary artery, vein graft to the first marginal branch of the circumflex and vein graft to the second marginal branch of the circumflex. 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 Overall, his postoperative course was unremarkable. I saw him in August 2012 and at that time he was doing well.     I saw him in 04/2015, which was about three years after his last appointment. At that time, he was complaining of noticing some spots in his eyes and some of his field of vision being obscured and brown. He has had a left carotid endarterectomy. His right carotid has apparently been stable at 60% and he follows with one of the vascular surgeons. I ordered a 48-hour Holter monitor 4/15, which was normal. I ordered an Echocardiogram 4/15, which showed left ventricular systolic function with ejection fraction of 57%, stage I-A diastolic dysfunction, mild left atrial enlargement, and no significant valvular heart disease. He also had an MRI of the brain, which showed no acute infarct.  There were tiny old infarcts noted as well as some hemosiderin deposits likely from old microhemorrhages in the past. He was supposed to follow up with me but did not.  Before Christmas 2015, he was on a chair trying to fix a clock and the chair slipped and he fell and broke his left wrist. He came to see me in January 2016 for preoperative clearance for her prescription wrist surgery.     He comes in today for follow-up.  He has been feeling well this past year.  He denies any chest pain.  His shortness of breath is at baseline.  He is not doing any regular exercise.  He continues with hemodialysis.  He says his blood pressure is usually a little bit elevated when he goes in but when he leaves his blood pressure is around 100/60.  He is noticed some numbness at the top of his toes which gets better  "when he is up and walking.    Review of Systems   Constitution: Negative for fever, malaise/fatigue, weight gain and weight loss.   HENT: Negative for ear pain, hearing loss, nosebleeds and sore throat.    Eyes: Negative for double vision, pain, vision loss in left eye and vision loss in right eye.   Cardiovascular:        See history of present illness.   Respiratory: Negative for cough, shortness of breath, sleep disturbances due to breathing, snoring and wheezing.    Endocrine: Negative for cold intolerance, heat intolerance and polyuria.   Skin: Negative for itching, poor wound healing and rash.   Musculoskeletal: Negative for joint pain, joint swelling and myalgias.   Gastrointestinal: Negative for abdominal pain, diarrhea, hematochezia, nausea and vomiting.   Genitourinary: Negative for hematuria and hesitancy.   Neurological: Negative for numbness, paresthesias and seizures.   Psychiatric/Behavioral: Negative for depression. The patient is not nervous/anxious.            ECG 12 Lead  Date/Time: 4/11/2019 10:45 AM  Performed by: Jane Rider MD  Authorized by: Jane Rider MD   Comparison: compared with previous ECG from 2/24/2019  Similar to previous ECG  Rhythm: sinus rhythm  BPM: 58  Conduction: conduction normal  Other findings: left ventricular hypertrophy with strain    Clinical impression: abnormal EKG               Objective:     /70 (BP Location: Left arm, Patient Position: Sitting, Cuff Size: Adult)   Pulse 58   Resp 16   Ht 177.8 cm (70\")   Wt 82.1 kg (181 lb)   BMI 25.97 kg/m²  Body mass index is 25.97 kg/m².     Physical Exam   Constitutional: He appears well-developed.   HENT:   Head: Normocephalic and atraumatic.   Eyes: Conjunctivae and lids are normal. Pupils are equal, round, and reactive to light. Lids are everted and swept, no foreign bodies found.   Neck: Normal range of motion. No JVD present. Carotid bruit is not present. No tracheal deviation present. No thyroid " mass present.   Cardiovascular: Normal rate, regular rhythm and normal heart sounds.   Pulses:       Dorsalis pedis pulses are 2+ on the right side, and 2+ on the left side.   He does not have a cardiac murmur but you can hear his fistula on the right side of his chest.   Pulmonary/Chest: Effort normal and breath sounds normal.   Abdominal: Normal appearance and bowel sounds are normal.   Musculoskeletal: Normal range of motion.   Neurological: He is alert. He has normal strength.   Skin: Skin is warm, dry and intact.   Psychiatric: He has a normal mood and affect. His behavior is normal.   Vitals reviewed.      Lab Review: reviewed      Assessment/Plan:       1. Coronary Artery Disease  Assessment  • The patient has no angina    Plan  • Lifestyle modifications discussed include medication compliance, regular exercise and regular monitoring of cholesterol and blood pressure    Subjective - Objective  • There is a history of previous coronary artery bypass graft on or around 7/1/2012  • Current antiplatelet therapy includes aspirin 81 mg    2. History of abnormal left ventricular systolic function. His last ejection fraction was normal at 57% but he did have some anterolateral hypokinesis. He is on a beta-blocker.   3. Peripheral vascular disease status post left carotid endarterectomy. He follows with Dr. Lovell.    4. Infra-abdominal aortic aneurysm s/p stent graft. followed by .   5. Hypertension controlled. Controlled with medication and hemodialysis.  6. Hyperlipidemia. Tolerating simvastatin  7. History of pulmonary nodules.   8. End-stage renal disease. He is on hemodialysis.   9. History of stroke.   10. History of poor nutrition with a history of G-tube but he is no longer having issues with that.   11. History of Methicillin-resistant Staphylococcus aureus bacteremia.      I encouraged regular exercise. I will see him back in one year unless he has problems sooner.    Orders Placed This Encounter    Procedures   • ECG 12 Lead     This order was created via procedure documentation        Discharge Medications           Accurate as of 4/11/19 11:08 AM. If you have any questions, ask your nurse or doctor.               Continue These Medications      Instructions Start Date   Acidophilus 100 MG capsule   1 tablet, Oral, Daily      aspirin 81 MG tablet   81 mg, Oral, Daily      folic acid 1 MG tablet  Commonly known as:  FOLVITE   1 mg, Oral, Daily      metoprolol succinate XL 25 MG 24 hr tablet  Commonly known as:  TOPROL-XL   25 mg, Oral, Nightly      RENAL 1 MG capsule capsule   1 capsule, Oral, Daily      simvastatin 40 MG tablet  Commonly known as:  ZOCOR   40 mg, Oral, Nightly                    Jane Rider MD  04/11/19  11:08 AM

## 2020-02-24 ENCOUNTER — OFFICE VISIT (OUTPATIENT)
Dept: FAMILY MEDICINE CLINIC | Facility: CLINIC | Age: 75
End: 2020-02-24

## 2020-02-24 ENCOUNTER — HOSPITAL ENCOUNTER (OUTPATIENT)
Dept: GENERAL RADIOLOGY | Facility: HOSPITAL | Age: 75
Discharge: HOME | End: 2020-02-24
Admitting: FAMILY MEDICINE

## 2020-02-24 VITALS
DIASTOLIC BLOOD PRESSURE: 62 MMHG | BODY MASS INDEX: 25.48 KG/M2 | HEART RATE: 73 BPM | OXYGEN SATURATION: 95 % | WEIGHT: 178 LBS | HEIGHT: 70 IN | TEMPERATURE: 98.2 F | SYSTOLIC BLOOD PRESSURE: 122 MMHG

## 2020-02-24 DIAGNOSIS — E78.2 MIXED HYPERLIPIDEMIA: ICD-10-CM

## 2020-02-24 DIAGNOSIS — N18.6 END-STAGE RENAL DISEASE (HCC): ICD-10-CM

## 2020-02-24 DIAGNOSIS — I10 ESSENTIAL HYPERTENSION: ICD-10-CM

## 2020-02-24 DIAGNOSIS — I25.10 CORONARY ARTERIOSCLEROSIS IN NATIVE ARTERY: ICD-10-CM

## 2020-02-24 DIAGNOSIS — M79.672 BILATERAL FOOT PAIN: ICD-10-CM

## 2020-02-24 DIAGNOSIS — I73.9 INTERMITTENT CLAUDICATION (HCC): ICD-10-CM

## 2020-02-24 DIAGNOSIS — R06.02 SHORTNESS OF BREATH: Primary | ICD-10-CM

## 2020-02-24 DIAGNOSIS — Z99.2 DEPENDENCE ON RENAL DIALYSIS (HCC): ICD-10-CM

## 2020-02-24 DIAGNOSIS — Z12.11 SCREENING FOR MALIGNANT NEOPLASM OF COLON: ICD-10-CM

## 2020-02-24 DIAGNOSIS — M79.671 BILATERAL FOOT PAIN: ICD-10-CM

## 2020-02-24 PROCEDURE — 73630 X-RAY EXAM OF FOOT: CPT

## 2020-02-24 PROCEDURE — 99214 OFFICE O/P EST MOD 30 MIN: CPT | Performed by: FAMILY MEDICINE

## 2020-02-25 NOTE — PROGRESS NOTES
Subjective   Lukasz Flynn is a 74 y.o. male with   Chief Complaint   Patient presents with   • Cough     for 3 weeks, mostly always dry   • Shortness of Breath   • Foot Pain   .    History of Present Illness   74-year-old white male with complaint of increased shortness of breath especially with exertion.  This has been present over the last 3 to 4 weeks and has not been accompanied by recent upper respiratory infection including no evidence of cough, congestion, fever or myalgias.  He states that when he is sitting and at rest he breathes well but the minute he begins to exert himself he has difficulty.  He has had a recent chest x-ray and preparation to go on a renal transplant list.  Chest x-ray showed no neoplastic mass or hyperinflation.  Diaphragms were do not and there was no evidence of congestive heart failure.  He does have a history of CAD with extremely significant dysfunction of the inferior lateral portion of his heart.  Last ejection fraction recently measured was 37%.  Patient denies peripheral edema and there has been no hemoptysis.  He also complains of bilateral foot pain which appears not to be present until he weight bears.  Foot pain however is in different locations and primarily located in the ball of the feet as well as longitudinal arch.  Patient denies pedal edema and there has been no trauma or initiating event.  He does state that his feet in general are very cold and this has not been evaluated in the past.  The following portions of the patient's history were reviewed and updated as appropriate: allergies, current medications, past family history, past medical history, past social history, past surgical history and problem list.    Review of Systems   Constitutional: Negative for fatigue and fever.   Respiratory: Positive for cough and shortness of breath.    Cardiovascular: Negative for chest pain, palpitations and leg swelling.        CAD, hypertension, hyperlipidemia    Genitourinary:        End-stage renal failure on dialysis   Musculoskeletal: Positive for gait problem.        Bilateral foot pain       Objective     Vitals:    02/24/20 1606   BP: 122/62   Pulse: 73   Temp: 98.2 °F (36.8 °C)   SpO2: 95%       No results found for this or any previous visit (from the past 672 hour(s)).    Physical Exam   Constitutional: He is oriented to person, place, and time. He appears well-developed and well-nourished.   HENT:   Head: Normocephalic and atraumatic.   Right Ear: Hearing, tympanic membrane, external ear and ear canal normal.   Left Ear: Hearing, tympanic membrane, external ear and ear canal normal.   Nose: Nose normal.   Mouth/Throat: Uvula is midline, oropharynx is clear and moist and mucous membranes are normal.   Neck: Trachea normal and phonation normal. Neck supple. Normal carotid pulses present. Carotid bruit is not present. No thyroid mass and no thyromegaly present.   Cardiovascular: Normal rate, regular rhythm and normal heart sounds. Exam reveals no gallop and no friction rub.   No murmur heard.  Pulses:       Dorsalis pedis pulses are 0 on the right side, and 0 on the left side.        Posterior tibial pulses are 0 on the right side, and 0 on the left side.   Bilateral feet are very cold pulseless without color change.   Pulmonary/Chest: Effort normal and breath sounds normal. No respiratory distress. He has no decreased breath sounds. He has no wheezes. He has no rhonchi. He has no rales.   Musculoskeletal:   Bilateral feet with no evidence of deformity, edema, erythema or other abnormality.  Range of motion at bilateral ankles and feet full bilaterally feet are nontender to palpation.   Lymphadenopathy:     He has no cervical adenopathy.   Neurological: He is alert and oriented to person, place, and time. He has normal strength. No sensory deficit.   Skin: Skin is warm and dry. No rash noted.   Psychiatric: He has a normal mood and affect. His speech is normal and  behavior is normal. Judgment and thought content normal. Cognition and memory are normal.   Nursing note and vitals reviewed.      Assessment/Plan   Lukasz was seen today for cough, shortness of breath and foot pain.    Diagnoses and all orders for this visit:    Shortness of breath  -     Full Pulmonary Function Test Without Bronchodilator; Future    Screening for malignant neoplasm of colon  -     Cologuard - Stool, Per Rectum    Bilateral foot pain  -     XR Foot 3+ View Bilateral (In Office)    Intermittent claudication (CMS/HCC)  -     US Ankle / Brachial Indices Extremity Complete; Future    End-stage renal disease (CMS/HCC)    Essential hypertension    Mixed hyperlipidemia    Coronary arteriosclerosis in native artery    Dependence on renal dialysis (CMS/HCC)    If formal pulmonary function test are unremarkable patient will be referred back to his cardiologist for their evaluation.  ABIs have been ordered with potential for vascular referral.  Cologuard will be accomplished at patient's request to complete necessary screening for renal transplant.    Return if symptoms worsen or fail to improve.

## 2020-02-27 DIAGNOSIS — M79.672 BILATERAL FOOT PAIN: Primary | ICD-10-CM

## 2020-02-27 DIAGNOSIS — M79.671 BILATERAL FOOT PAIN: Primary | ICD-10-CM

## 2020-03-04 ENCOUNTER — TRANSCRIBE ORDERS (OUTPATIENT)
Dept: ADMINISTRATIVE | Facility: HOSPITAL | Age: 75
End: 2020-03-04

## 2020-03-04 ENCOUNTER — HOSPITAL ENCOUNTER (OUTPATIENT)
Dept: ULTRASOUND IMAGING | Facility: HOSPITAL | Age: 75
Discharge: HOME | End: 2020-03-04
Admitting: FAMILY MEDICINE

## 2020-03-04 ENCOUNTER — HOSPITAL ENCOUNTER (OUTPATIENT)
Dept: PULMONOLOGY | Facility: HOSPITAL | Age: 75
Discharge: HOME | End: 2020-03-04

## 2020-03-04 ENCOUNTER — HOSPITAL ENCOUNTER (OUTPATIENT)
Dept: GENERAL RADIOLOGY | Facility: HOSPITAL | Age: 75
Discharge: HOME | End: 2020-03-04

## 2020-03-04 VITALS — HEART RATE: 62 BPM | OXYGEN SATURATION: 100 % | RESPIRATION RATE: 20 BRPM

## 2020-03-04 DIAGNOSIS — R06.02 SHORTNESS OF BREATH: ICD-10-CM

## 2020-03-04 DIAGNOSIS — R06.02 SHORTNESS OF BREATH: Primary | ICD-10-CM

## 2020-03-04 DIAGNOSIS — I73.9 INTERMITTENT CLAUDICATION (HCC): ICD-10-CM

## 2020-03-04 PROCEDURE — 94729 DIFFUSING CAPACITY: CPT

## 2020-03-04 PROCEDURE — 93923 UPR/LXTR ART STDY 3+ LVLS: CPT

## 2020-03-04 PROCEDURE — 71046 X-RAY EXAM CHEST 2 VIEWS: CPT

## 2020-03-04 PROCEDURE — 94060 EVALUATION OF WHEEZING: CPT

## 2020-03-04 PROCEDURE — 94726 PLETHYSMOGRAPHY LUNG VOLUMES: CPT

## 2020-03-04 RX ORDER — ALBUTEROL SULFATE 2.5 MG/3ML
2.5 SOLUTION RESPIRATORY (INHALATION) ONCE
Status: COMPLETED | OUTPATIENT
Start: 2020-03-04 | End: 2020-03-04

## 2020-03-04 RX ADMIN — ALBUTEROL SULFATE 2.5 MG: 2.5 SOLUTION RESPIRATORY (INHALATION) at 14:43

## 2020-03-06 DIAGNOSIS — I73.9 PAD (PERIPHERAL ARTERY DISEASE) (HCC): Primary | ICD-10-CM

## 2020-03-09 ENCOUNTER — OFFICE VISIT (OUTPATIENT)
Dept: FAMILY MEDICINE CLINIC | Facility: CLINIC | Age: 75
End: 2020-03-09

## 2020-03-09 VITALS
OXYGEN SATURATION: 97 % | HEART RATE: 60 BPM | SYSTOLIC BLOOD PRESSURE: 120 MMHG | WEIGHT: 175 LBS | DIASTOLIC BLOOD PRESSURE: 66 MMHG | HEIGHT: 70 IN | BODY MASS INDEX: 25.05 KG/M2

## 2020-03-09 DIAGNOSIS — Z79.899 HIGH RISK MEDICATION USE: ICD-10-CM

## 2020-03-09 DIAGNOSIS — J18.9 PNEUMONIA OF RIGHT LOWER LOBE DUE TO INFECTIOUS ORGANISM: Primary | ICD-10-CM

## 2020-03-09 PROCEDURE — 99213 OFFICE O/P EST LOW 20 MIN: CPT | Performed by: FAMILY MEDICINE

## 2020-03-09 RX ORDER — GUAIFENESIN AND CODEINE PHOSPHATE 100; 10 MG/5ML; MG/5ML
10 SOLUTION ORAL 3 TIMES DAILY PRN
Qty: 240 ML | Refills: 0 | Status: SHIPPED | OUTPATIENT
Start: 2020-03-09 | End: 2020-03-26

## 2020-03-09 RX ORDER — LEVOFLOXACIN 500 MG/1
500 TABLET, FILM COATED ORAL DAILY
Qty: 10 TABLET | Refills: 0 | Status: SHIPPED | OUTPATIENT
Start: 2020-03-09 | End: 2020-03-26

## 2020-03-10 NOTE — PROGRESS NOTES
Subjective   Lukasz Flynn is a 74 y.o. male with   Chief Complaint   Patient presents with   • Shortness of Breath     f/u chest xray    .    History of Present Illness   74-year-old white male with increased cough and shortness of breath over the last 2 weeks.  Patient has been seen in local urgent care center with chest x-ray obtained.  He states the provider stated that his lung auscultation appeared clear but chest x-ray showed infiltrate in the right lower lobe.  Patient was started on Keflex and given guaifenesin with codeine.  The codeine actually quelled his cough at night and allowed him to sleep initially but quickly became ineffective.  He has basically felt no improvement since urgent care visit of several days ago.  The following portions of the patient's history were reviewed and updated as appropriate: allergies, current medications, past family history, past medical history, past social history, past surgical history and problem list.    Review of Systems   Constitutional: Positive for fatigue and fever.   HENT: Positive for congestion.    Respiratory: Positive for cough and shortness of breath.         Right lower lobe pneumonia   Cardiovascular: Negative for chest pain.       Objective     Vitals:    03/09/20 1622   BP: 120/66   Pulse: 60   SpO2: 97%       No results found for this or any previous visit (from the past 672 hour(s)).    Physical Exam   Constitutional: He is oriented to person, place, and time. He appears well-developed and well-nourished.   HENT:   Head: Normocephalic and atraumatic.   Right Ear: Hearing, tympanic membrane, external ear and ear canal normal.   Left Ear: Hearing, tympanic membrane, external ear and ear canal normal.   Nose: Nose normal.   Mouth/Throat: Uvula is midline, oropharynx is clear and moist and mucous membranes are normal.   Neck: Trachea normal and phonation normal. Neck supple. Normal carotid pulses present. Carotid bruit is not present. No thyroid mass  and no thyromegaly present.   Cardiovascular: Normal rate, regular rhythm and normal heart sounds. Exam reveals no gallop and no friction rub.   No murmur heard.  Pulmonary/Chest: Effort normal and breath sounds normal. No respiratory distress. He has no decreased breath sounds. He has no wheezes. He has no rhonchi. He has no rales.   Lymphadenopathy:     He has no cervical adenopathy.   Neurological: He is alert and oriented to person, place, and time.   Skin: Skin is warm and dry. No rash noted.   Psychiatric: He has a normal mood and affect. His speech is normal and behavior is normal. Judgment and thought content normal. Cognition and memory are normal.   Nursing note and vitals reviewed.  Review chest x-ray as well as note from urgent care center accomplished during this visit.    Assessment/Plan   Lukasz was seen today for shortness of breath.    Diagnoses and all orders for this visit:    Pneumonia of right lower lobe due to infectious organism (CMS/[facility])  -     levoFLOXacin (LEVAQUIN) 500 MG tablet; Take 1 tablet by mouth Daily.  -     guaiFENesin-codeine (GUAIFENESIN AC) 100-10 MG/5ML liquid; Take 10 mL by mouth 3 (Three) Times a Day As Needed for Cough.    High risk medication use  -     Compliance Drug Analysis, Ur - Urine, Clean Catch        Return in about 1 week (around 3/16/2020).

## 2020-03-15 LAB — DRUGS UR: NORMAL

## 2020-03-16 ENCOUNTER — RESULTS ENCOUNTER (OUTPATIENT)
Dept: FAMILY MEDICINE CLINIC | Facility: CLINIC | Age: 75
End: 2020-03-16

## 2020-03-16 DIAGNOSIS — Z12.11 SCREENING FOR MALIGNANT NEOPLASM OF COLON: ICD-10-CM

## 2020-03-24 ENCOUNTER — HOSPITAL ENCOUNTER (OUTPATIENT)
Dept: GENERAL RADIOLOGY | Facility: HOSPITAL | Age: 75
Discharge: HOME | End: 2020-03-24
Admitting: FAMILY MEDICINE

## 2020-03-24 ENCOUNTER — TELEPHONE (OUTPATIENT)
Dept: FAMILY MEDICINE CLINIC | Facility: CLINIC | Age: 75
End: 2020-03-24

## 2020-03-24 DIAGNOSIS — J15.9 BACTERIAL PNEUMONIA: ICD-10-CM

## 2020-03-24 DIAGNOSIS — J15.9 BACTERIAL PNEUMONIA: Primary | ICD-10-CM

## 2020-03-24 PROCEDURE — 71046 X-RAY EXAM CHEST 2 VIEWS: CPT

## 2020-03-24 NOTE — TELEPHONE ENCOUNTER
Pt was seen 3/9 and dx with pneumonia.  He still has a terrible cough and his wife states you were going to give him something else if this was the case or occurred.

## 2020-03-25 ENCOUNTER — TELEPHONE (OUTPATIENT)
Dept: FAMILY MEDICINE CLINIC | Facility: CLINIC | Age: 75
End: 2020-03-25

## 2020-03-25 NOTE — TELEPHONE ENCOUNTER
I have already answered this and another area of the chart.  Chest x-ray did not show pneumonia.  If he still is coughing I would want to see him back in the office.

## 2020-03-26 ENCOUNTER — OFFICE VISIT (OUTPATIENT)
Dept: FAMILY MEDICINE CLINIC | Facility: CLINIC | Age: 75
End: 2020-03-26

## 2020-03-26 VITALS
DIASTOLIC BLOOD PRESSURE: 68 MMHG | TEMPERATURE: 97.9 F | OXYGEN SATURATION: 91 % | WEIGHT: 170 LBS | SYSTOLIC BLOOD PRESSURE: 136 MMHG | HEART RATE: 69 BPM | BODY MASS INDEX: 24.34 KG/M2 | HEIGHT: 70 IN

## 2020-03-26 DIAGNOSIS — J44.1 CHRONIC OBSTRUCTIVE PULMONARY DISEASE WITH ACUTE EXACERBATION (HCC): Primary | ICD-10-CM

## 2020-03-26 PROCEDURE — 99213 OFFICE O/P EST LOW 20 MIN: CPT | Performed by: FAMILY MEDICINE

## 2020-03-26 RX ORDER — BUDESONIDE AND FORMOTEROL FUMARATE DIHYDRATE 160; 4.5 UG/1; UG/1
2 AEROSOL RESPIRATORY (INHALATION)
Qty: 1 INHALER | Refills: 0 | COMMUNITY
Start: 2020-03-26 | End: 2020-04-22

## 2020-03-26 RX ORDER — DOXYCYCLINE HYCLATE 100 MG
100 TABLET ORAL 2 TIMES DAILY
Qty: 20 TABLET | Refills: 0 | Status: SHIPPED | OUTPATIENT
Start: 2020-03-26 | End: 2020-04-22

## 2020-03-26 NOTE — PROGRESS NOTES
Subjective   Lukasz Flynn is a 74 y.o. male with   Chief Complaint   Patient presents with   • Follow up Pneumonia   • Cough     no fever   • Shortness of Breath   .    History of Present Illness   74-year-old white male who has recently been diagnosed in early March with right lower lobe pneumonia.  This was secondary to an x-ray taken at an urgent care center.  During this visit patient was started on Keflex and followed up in this office 5 to 6 days later.  He was not improving and patient was seen by me.  Keflex was discontinued and Levaquin was started.  He has made minimal improvement but continues to cough and at times to be short of breath.  He denies fever and there has been no hemoptysis.  Patient denies weight loss.  Chest x-ray obtained 2 days ago showed that right lower lobe pneumonia had resolved and there was persistent bilateral pleural effusions that were very small and atelectasis in the bases of the lungs bilaterally.  Chest x-ray also showed emphysematous changes.  Patient has a past history of smoking but discontinued this many years ago.  His current complaint is persistent cough and shortness of air.  The following portions of the patient's history were reviewed and updated as appropriate: allergies, current medications, past family history, past medical history, past social history, past surgical history and problem list.    Review of Systems   Constitutional: Negative for fever.   HENT: Negative for congestion.    Respiratory: Positive for cough and shortness of breath. Negative for wheezing.    Cardiovascular: Negative for chest pain.       Objective     Vitals:    03/26/20 0959   BP: 136/68   Pulse: 69   Temp: 97.9 °F (36.6 °C)   SpO2: 91%       Recent Results (from the past 672 hour(s))   Compliance Drug Analysis, Ur - Urine, Clean Catch    Collection Time: 03/09/20  5:02 PM   Result Value Ref Range    Report Summary FINAL        Physical Exam   Constitutional: He is oriented to person,  place, and time. He appears well-developed and well-nourished.   HENT:   Head: Normocephalic and atraumatic.   Right Ear: Hearing, tympanic membrane, external ear and ear canal normal.   Left Ear: Hearing, tympanic membrane, external ear and ear canal normal.   Nose: Nose normal.   Mouth/Throat: Uvula is midline, oropharynx is clear and moist and mucous membranes are normal.   Neck: Trachea normal and phonation normal. Neck supple. Normal carotid pulses present. Carotid bruit is not present. No thyroid mass and no thyromegaly present.   Cardiovascular: Normal rate, regular rhythm and normal heart sounds. Exam reveals no gallop and no friction rub.   No murmur heard.  Pulmonary/Chest: Effort normal and breath sounds normal. No respiratory distress. He has no decreased breath sounds. He has no wheezes. He has no rhonchi. He has no rales.   Pulmonary auscultation with good bilateral breath sounds without evidence of rhonchi,  rales or wheezes.   Lymphadenopathy:     He has no cervical adenopathy.   Neurological: He is alert and oriented to person, place, and time.   Skin: Skin is warm and dry. No rash noted.   Psychiatric: He has a normal mood and affect. His speech is normal and behavior is normal. Judgment and thought content normal. Cognition and memory are normal.   Nursing note and vitals reviewed.      Assessment/Plan   Lukasz was seen today for follow up pneumonia, cough and shortness of breath.    Diagnoses and all orders for this visit:    Chronic obstructive pulmonary disease with acute exacerbation (CMS/HCC)  -     doxycycline (VIBRAMYICN) 100 MG tablet; Take 1 tablet by mouth 2 (Two) Times a Day.  -     budesonide-formoterol (Symbicort) 160-4.5 MCG/ACT inhaler; Inhale 2 puffs 2 (Two) Times a Day.        Return in about 1 week (around 4/2/2020).

## 2020-04-22 ENCOUNTER — TELEPHONE (OUTPATIENT)
Dept: CARDIOLOGY | Facility: CLINIC | Age: 75
End: 2020-04-22

## 2020-04-22 ENCOUNTER — OFFICE VISIT (OUTPATIENT)
Dept: CARDIOLOGY | Facility: CLINIC | Age: 75
End: 2020-04-22

## 2020-04-22 VITALS
WEIGHT: 170 LBS | TEMPERATURE: 97.4 F | SYSTOLIC BLOOD PRESSURE: 117 MMHG | HEIGHT: 70 IN | DIASTOLIC BLOOD PRESSURE: 89 MMHG | HEART RATE: 67 BPM | BODY MASS INDEX: 24.34 KG/M2

## 2020-04-22 DIAGNOSIS — I71.40 ABDOMINAL AORTIC ANEURYSM (AAA) WITHOUT RUPTURE (HCC): ICD-10-CM

## 2020-04-22 DIAGNOSIS — I50.23 ACUTE ON CHRONIC SYSTOLIC CHF (CONGESTIVE HEART FAILURE) (HCC): ICD-10-CM

## 2020-04-22 DIAGNOSIS — I10 ESSENTIAL HYPERTENSION: ICD-10-CM

## 2020-04-22 DIAGNOSIS — I25.10 CORONARY ARTERIOSCLEROSIS IN NATIVE ARTERY: Primary | ICD-10-CM

## 2020-04-22 PROCEDURE — 99442 PR PHYS/QHP TELEPHONE EVALUATION 11-20 MIN: CPT | Performed by: INTERNAL MEDICINE

## 2020-04-22 NOTE — TELEPHONE ENCOUNTER
4/22 Dr Rider is it ok to schedule him sooner than later, this week or next week?    Please let me know    Thank you    Abhishek DAVILA

## 2020-04-22 NOTE — PROGRESS NOTES
PATIENTINFORMATION    Date of Office Visit: 20  Encounter Provider: Jane Rider MD  Place of Service: [facility] CARDIOLOGY  Patient Name: Lukasz Flynn  : 1945    Subjective:         Patient ID: Lukasz Flynn is a 74 y.o. male.      History of Present Illness    I initially saw him for an abnormal EKG in . A stress test in 2011 showed a diminished ejection fraction at 40-45% with left ventricular hypertrophy. The stress test was abnormal which led to a heart catheterization . This showed significant three vessel coronary disease. While in the hospital in 2011 for his heart catheterization, he was found to have significant carotid disease. An endovascular attempt to stent his carotid was unsuccessful. In 2011 he had a left carotid endarterectomy. In 2011 he was admitted with renal failure which required dialysis. 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 After he left the hospital in the spring of 2011 he failed to follow up with me. He did followup with his nephrologist and was placed on hemodialysis.      I saw him again in the hospital in the spring of 2012. At this time, he was admitted with MRSA bacteremia and a non-ST elevation myocardial infarction. Repeat heart catheterization  continued to show significant three vessel disease. A repeat transesophageal echocardiogram on 2012 showed no evidence of endocarditis, but he did have a small patent foramen ovale and significant atherosclerotic disease throughout the entire aorta with multiple mobile plaques identified. He came back to see me in the office in 2012 and was doing quite well. I had him followup with Dr. Anthony to schedule his bypass surgery. He was scheduled for  but presented on  with chest pain and a non-ST elevation myocardial infarction. He emergently went to the operating room on  where he  had a four vessel bypass including a LIMA to the LAD, vein graft to the right coronary artery, vein graft to the first marginal branch of the circumflex and vein graft to the second marginal branch of the circumflex. 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 Overall, his postoperative course was unremarkable. I saw him in August 2012 and at that time he was doing well.     I saw him in 04/2015, which was about three years after his last appointment. At that time, he was complaining of noticing some spots in his eyes and some of his field of vision being obscured and brown. He has had a left carotid endarterectomy. His right carotid has apparently been stable at 60% and he follows with one of the vascular surgeons. I ordered a 48-hour Holter monitor 4/15, which was normal. I ordered an Echocardiogram 4/15, which showed left ventricular systolic function with ejection fraction of 57%, stage I-A diastolic dysfunction, mild left atrial enlargement, and no significant valvular heart disease. He also had an MRI of the brain, which showed no acute infarct.  There were tiny old infarcts noted as well as some hemosiderin deposits likely from old microhemorrhages in the past. He was supposed to follow up with me but did not.  Before Christmas 2015, he was on a chair trying to fix a clock and the chair slipped and he fell and broke his left wrist. He came to see me in January 2016 for preoperative clearance for her prescription wrist surgery.     I spoke with him over the telephone today.  He has been very short of breath.  He has orthopnea.  He has no lower extremity edema.  He tells me at dialysis they have been taking off and actually kilogram with the last few treatment but he is still very short of breath.  I reviewed [facility].  His ejection fraction has dropped from normal down to about 30%.  No significant valve abnormalities.  He had a nuclear stress test  "which showed an infarction in the inferior wall but no new ischemia.        Objective:     /89 (BP Location: Left arm, Patient Position: Sitting, Cuff Size: Adult)   Pulse 67   Temp 97.4 °F (36.3 °C)   Ht 177.8 cm (70\")   Wt 77.1 kg (170 lb)   BMI 24.39 kg/m²  Body mass index is 24.39 kg/m².       I reviewed his recent chest x-ray as well as his heart studies from the [facility].      Assessment/Plan:       1. Coronary Artery Disease  Assessment  • The patient has no angina     Plan  • Lifestyle modifications discussed include medication compliance, regular exercise and regular monitoring of cholesterol and blood pressure     Subjective - Objective  • There is a history of previous coronary artery bypass graft on or around 7/1/2012  • Current antiplatelet therapy includes aspirin 81 mg   -His ejection fraction has dropped to about 30%.  Stress test that [facility] did not show ischemia.  I still think he probably will need a heart catheterization.   2. History of abnormal left ventricular systolic function. His last ejection fraction was normal at 57% but he did have some anterolateral hypokinesis. He is on a beta-blocker.   3. Peripheral vascular disease status post left carotid endarterectomy. He follows with Dr. Lovell.    4. Infra-abdominal aortic aneurysm s/p stent graft. followed by .   5. Hypertension controlled. Controlled with medication and hemodialysis.  6. Hyperlipidemia. Tolerating simvastatin  7. History of pulmonary nodules.   8. End-stage renal disease. He is on hemodialysis.   9. History of stroke.   10. History of poor nutrition with a history of G-tube but he is no longer having issues with that.   11. History of Methicillin-resistant Staphylococcus aureus bacteremia.     I spoke with Dr. Mcgowan who is going to try to pull more fluid off in dialysis.  I will arrange for heart catheterization in 4 to 6 weeks when hopefully we are doing elective testing.       You " have chosen to receive care through a telephone visit today. Do you consent to use a telephone visit for your medical care today? Yes.    Telemedicine visit lasted 20 minutes.    No orders of the defined types were placed in this encounter.       Discharge Medications           Accurate as of April 22, 2020  2:50 PM. If you have any questions, ask your nurse or doctor.               Continue These Medications      Instructions Start Date   Acidophilus 100 MG capsule   1 tablet, Oral, Daily      aspirin 81 MG tablet   81 mg, Oral, Daily      folic acid 1 MG tablet  Commonly known as:  FOLVITE   1 mg, Oral, Daily      metoprolol succinate XL 25 MG 24 hr tablet  Commonly known as:  TOPROL-XL   25 mg, Oral, Nightly      Renal 1 MG capsule capsule   1 capsule, Oral, Daily      simvastatin 40 MG tablet  Commonly known as:  ZOCOR   40 mg, Oral, Nightly         Stop These Medications    budesonide-formoterol 160-4.5 MCG/ACT inhaler  Commonly known as:  Symbicort  Stopped by:  Jnae Rider MD     doxycycline 100 MG tablet  Commonly known as:  VIBRAMYICN  Stopped by:  MD Jane Kumari MD  04/22/20  14:50

## 2020-05-12 ENCOUNTER — TRANSCRIBE ORDERS (OUTPATIENT)
Dept: CARDIOLOGY | Facility: CLINIC | Age: 75
End: 2020-05-12

## 2020-05-12 DIAGNOSIS — Z01.810 PRE-OPERATIVE CARDIOVASCULAR EXAMINATION: Primary | ICD-10-CM

## 2020-05-12 DIAGNOSIS — Z13.6 SCREENING FOR ISCHEMIC HEART DISEASE: ICD-10-CM

## 2020-05-15 PROBLEM — I71.40 ABDOMINAL AORTIC ANEURYSM (AAA) WITHOUT RUPTURE: Status: ACTIVE | Noted: 2020-05-15

## 2020-05-15 PROBLEM — I50.23 ACUTE ON CHRONIC SYSTOLIC CHF (CONGESTIVE HEART FAILURE): Status: ACTIVE | Noted: 2020-05-15

## 2020-05-18 ENCOUNTER — TRANSCRIBE ORDERS (OUTPATIENT)
Dept: SLEEP MEDICINE | Facility: HOSPITAL | Age: 75
End: 2020-05-18

## 2020-05-18 DIAGNOSIS — Z01.818 OTHER SPECIFIED PRE-OPERATIVE EXAMINATION: Primary | ICD-10-CM

## 2020-05-19 ENCOUNTER — LAB (OUTPATIENT)
Dept: LAB | Facility: HOSPITAL | Age: 75
End: 2020-05-19

## 2020-05-19 DIAGNOSIS — Z01.818 OTHER SPECIFIED PRE-OPERATIVE EXAMINATION: ICD-10-CM

## 2020-05-19 DIAGNOSIS — Z13.6 SCREENING FOR ISCHEMIC HEART DISEASE: ICD-10-CM

## 2020-05-19 DIAGNOSIS — Z01.810 PRE-OPERATIVE CARDIOVASCULAR EXAMINATION: ICD-10-CM

## 2020-05-19 LAB
ANION GAP SERPL CALCULATED.3IONS-SCNC: 12.4 MMOL/L (ref 5–15)
BASOPHILS # BLD AUTO: 0.05 10*3/MM3 (ref 0–0.2)
BASOPHILS NFR BLD AUTO: 0.6 % (ref 0–1.5)
BUN BLD-MCNC: 30 MG/DL (ref 8–23)
BUN/CREAT SERPL: 9.7 (ref 7–25)
CALCIUM SPEC-SCNC: 9.3 MG/DL (ref 8.6–10.5)
CHLORIDE SERPL-SCNC: 93 MMOL/L (ref 98–107)
CO2 SERPL-SCNC: 30.6 MMOL/L (ref 22–29)
CREAT BLD-MCNC: 3.1 MG/DL (ref 0.76–1.27)
DEPRECATED RDW RBC AUTO: 47.2 FL (ref 37–54)
EOSINOPHIL # BLD AUTO: 0.08 10*3/MM3 (ref 0–0.4)
EOSINOPHIL NFR BLD AUTO: 1 % (ref 0.3–6.2)
ERYTHROCYTE [DISTWIDTH] IN BLOOD BY AUTOMATED COUNT: 14.4 % (ref 12.3–15.4)
GFR SERPL CREATININE-BSD FRML MDRD: 20 ML/MIN/1.73
GLUCOSE BLD-MCNC: 165 MG/DL (ref 65–99)
HCT VFR BLD AUTO: 36.4 % (ref 37.5–51)
HGB BLD-MCNC: 12 G/DL (ref 13–17.7)
IMM GRANULOCYTES # BLD AUTO: 0.02 10*3/MM3 (ref 0–0.05)
IMM GRANULOCYTES NFR BLD AUTO: 0.3 % (ref 0–0.5)
LYMPHOCYTES # BLD AUTO: 1.96 10*3/MM3 (ref 0.7–3.1)
LYMPHOCYTES NFR BLD AUTO: 25.4 % (ref 19.6–45.3)
MCH RBC QN AUTO: 29.8 PG (ref 26.6–33)
MCHC RBC AUTO-ENTMCNC: 33 G/DL (ref 31.5–35.7)
MCV RBC AUTO: 90.3 FL (ref 79–97)
MONOCYTES # BLD AUTO: 0.73 10*3/MM3 (ref 0.1–0.9)
MONOCYTES NFR BLD AUTO: 9.5 % (ref 5–12)
NEUTROPHILS # BLD AUTO: 4.88 10*3/MM3 (ref 1.7–7)
NEUTROPHILS NFR BLD AUTO: 63.2 % (ref 42.7–76)
NRBC BLD AUTO-RTO: 0 /100 WBC (ref 0–0.2)
PLATELET # BLD AUTO: 188 10*3/MM3 (ref 140–450)
PMV BLD AUTO: 11.4 FL (ref 6–12)
POTASSIUM BLD-SCNC: 3.9 MMOL/L (ref 3.5–5.2)
RBC # BLD AUTO: 4.03 10*6/MM3 (ref 4.14–5.8)
SODIUM BLD-SCNC: 136 MMOL/L (ref 136–145)
WBC NRBC COR # BLD: 7.72 10*3/MM3 (ref 3.4–10.8)

## 2020-05-19 PROCEDURE — 36415 COLL VENOUS BLD VENIPUNCTURE: CPT

## 2020-05-19 PROCEDURE — 80048 BASIC METABOLIC PNL TOTAL CA: CPT

## 2020-05-19 PROCEDURE — C9803 HOPD COVID-19 SPEC COLLECT: HCPCS

## 2020-05-19 PROCEDURE — 85025 COMPLETE CBC W/AUTO DIFF WBC: CPT

## 2020-05-19 PROCEDURE — U0004 COV-19 TEST NON-CDC HGH THRU: HCPCS | Performed by: INTERNAL MEDICINE

## 2020-05-20 LAB
REF LAB TEST METHOD: NORMAL
SARS-COV-2 RNA RESP QL NAA+PROBE: NOT DETECTED

## 2020-05-21 ENCOUNTER — HOSPITAL ENCOUNTER (OUTPATIENT)
Facility: HOSPITAL | Age: 75
Discharge: HOME | End: 2020-05-22
Attending: INTERNAL MEDICINE | Admitting: INTERNAL MEDICINE

## 2020-05-21 DIAGNOSIS — I25.10 CORONARY ARTERIOSCLEROSIS IN NATIVE ARTERY: ICD-10-CM

## 2020-05-21 DIAGNOSIS — I10 ESSENTIAL HYPERTENSION: ICD-10-CM

## 2020-05-21 DIAGNOSIS — I50.23 ACUTE ON CHRONIC SYSTOLIC CHF (CONGESTIVE HEART FAILURE) (HCC): ICD-10-CM

## 2020-05-21 DIAGNOSIS — I71.40 ABDOMINAL AORTIC ANEURYSM (AAA) WITHOUT RUPTURE (HCC): ICD-10-CM

## 2020-05-21 PROCEDURE — C9604 PERC D-E COR REVASC T CABG S: HCPCS | Performed by: INTERNAL MEDICINE

## 2020-05-21 PROCEDURE — 99152 MOD SED SAME PHYS/QHP 5/>YRS: CPT | Performed by: INTERNAL MEDICINE

## 2020-05-21 PROCEDURE — 85014 HEMATOCRIT: CPT

## 2020-05-21 PROCEDURE — G0378 HOSPITAL OBSERVATION PER HR: HCPCS

## 2020-05-21 PROCEDURE — 85347 COAGULATION TIME ACTIVATED: CPT

## 2020-05-21 PROCEDURE — 25010000002 MIDAZOLAM PER 1 MG: Performed by: INTERNAL MEDICINE

## 2020-05-21 PROCEDURE — 93005 ELECTROCARDIOGRAM TRACING: CPT | Performed by: INTERNAL MEDICINE

## 2020-05-21 PROCEDURE — C1769 GUIDE WIRE: HCPCS | Performed by: INTERNAL MEDICINE

## 2020-05-21 PROCEDURE — 99153 MOD SED SAME PHYS/QHP EA: CPT | Performed by: INTERNAL MEDICINE

## 2020-05-21 PROCEDURE — 93461 R&L HRT ART/VENTRICLE ANGIO: CPT | Performed by: INTERNAL MEDICINE

## 2020-05-21 PROCEDURE — C1894 INTRO/SHEATH, NON-LASER: HCPCS | Performed by: INTERNAL MEDICINE

## 2020-05-21 PROCEDURE — C1874 STENT, COATED/COV W/DEL SYS: HCPCS | Performed by: INTERNAL MEDICINE

## 2020-05-21 PROCEDURE — 93010 ELECTROCARDIOGRAM REPORT: CPT | Performed by: INTERNAL MEDICINE

## 2020-05-21 PROCEDURE — 85018 HEMOGLOBIN: CPT

## 2020-05-21 PROCEDURE — 25010000002 FENTANYL CITRATE (PF) 100 MCG/2ML SOLUTION: Performed by: INTERNAL MEDICINE

## 2020-05-21 PROCEDURE — 92937 PRQ TRLUML REVSC CAB GRF 1: CPT | Performed by: INTERNAL MEDICINE

## 2020-05-21 PROCEDURE — 0 IOPAMIDOL PER 1 ML: Performed by: INTERNAL MEDICINE

## 2020-05-21 PROCEDURE — C1887 CATHETER, GUIDING: HCPCS | Performed by: INTERNAL MEDICINE

## 2020-05-21 PROCEDURE — 25010000002 HEPARIN (PORCINE) PER 1000 UNITS: Performed by: INTERNAL MEDICINE

## 2020-05-21 DEVICE — XIENCE SIERRA™ EVEROLIMUS ELUTING CORONARY STENT SYSTEM 3.50 MM X 23 MM / RAPID-EXCHANGE
Type: IMPLANTABLE DEVICE | Status: FUNCTIONAL
Brand: XIENCE SIERRA™

## 2020-05-21 RX ORDER — SODIUM CHLORIDE 9 MG/ML
75 INJECTION, SOLUTION INTRAVENOUS CONTINUOUS
Status: DISCONTINUED | OUTPATIENT
Start: 2020-05-21 | End: 2020-05-22 | Stop reason: HOSPADM

## 2020-05-21 RX ORDER — CLOPIDOGREL BISULFATE 75 MG/1
75 TABLET ORAL DAILY
Status: DISCONTINUED | OUTPATIENT
Start: 2020-05-22 | End: 2020-05-22 | Stop reason: HOSPADM

## 2020-05-21 RX ORDER — LIDOCAINE HYDROCHLORIDE 10 MG/ML
0.1 INJECTION, SOLUTION EPIDURAL; INFILTRATION; INTRACAUDAL; PERINEURAL ONCE AS NEEDED
Status: DISCONTINUED | OUTPATIENT
Start: 2020-05-21 | End: 2020-05-21 | Stop reason: HOSPADM

## 2020-05-21 RX ORDER — METOPROLOL SUCCINATE 25 MG/1
25 TABLET, EXTENDED RELEASE ORAL NIGHTLY
Status: DISCONTINUED | OUTPATIENT
Start: 2020-05-21 | End: 2020-05-22 | Stop reason: HOSPADM

## 2020-05-21 RX ORDER — CLOPIDOGREL BISULFATE 75 MG/1
TABLET ORAL AS NEEDED
Status: DISCONTINUED | OUTPATIENT
Start: 2020-05-21 | End: 2020-05-21 | Stop reason: HOSPADM

## 2020-05-21 RX ORDER — ONDANSETRON 4 MG/1
4 TABLET, FILM COATED ORAL EVERY 6 HOURS PRN
Status: DISCONTINUED | OUTPATIENT
Start: 2020-05-21 | End: 2020-05-22 | Stop reason: HOSPADM

## 2020-05-21 RX ORDER — ATORVASTATIN CALCIUM 20 MG/1
40 TABLET, FILM COATED ORAL NIGHTLY
Status: DISCONTINUED | OUTPATIENT
Start: 2020-05-21 | End: 2020-05-22 | Stop reason: HOSPADM

## 2020-05-21 RX ORDER — MIDAZOLAM HYDROCHLORIDE 1 MG/ML
INJECTION INTRAMUSCULAR; INTRAVENOUS AS NEEDED
Status: DISCONTINUED | OUTPATIENT
Start: 2020-05-21 | End: 2020-05-21 | Stop reason: HOSPADM

## 2020-05-21 RX ORDER — FENTANYL CITRATE 50 UG/ML
INJECTION, SOLUTION INTRAMUSCULAR; INTRAVENOUS AS NEEDED
Status: DISCONTINUED | OUTPATIENT
Start: 2020-05-21 | End: 2020-05-21 | Stop reason: HOSPADM

## 2020-05-21 RX ORDER — HEPARIN SODIUM 1000 [USP'U]/ML
INJECTION, SOLUTION INTRAVENOUS; SUBCUTANEOUS AS NEEDED
Status: DISCONTINUED | OUTPATIENT
Start: 2020-05-21 | End: 2020-05-21 | Stop reason: HOSPADM

## 2020-05-21 RX ORDER — HYDROCODONE BITARTRATE AND ACETAMINOPHEN 5; 325 MG/1; MG/1
1 TABLET ORAL EVERY 4 HOURS PRN
Status: DISCONTINUED | OUTPATIENT
Start: 2020-05-21 | End: 2020-05-22 | Stop reason: HOSPADM

## 2020-05-21 RX ORDER — SODIUM CHLORIDE 0.9 % (FLUSH) 0.9 %
10 SYRINGE (ML) INJECTION AS NEEDED
Status: DISCONTINUED | OUTPATIENT
Start: 2020-05-21 | End: 2020-05-21 | Stop reason: HOSPADM

## 2020-05-21 RX ORDER — SODIUM CHLORIDE 9 MG/ML
250 INJECTION, SOLUTION INTRAVENOUS ONCE AS NEEDED
Status: DISCONTINUED | OUTPATIENT
Start: 2020-05-21 | End: 2020-05-22 | Stop reason: HOSPADM

## 2020-05-21 RX ORDER — ONDANSETRON 2 MG/ML
4 INJECTION INTRAMUSCULAR; INTRAVENOUS EVERY 6 HOURS PRN
Status: DISCONTINUED | OUTPATIENT
Start: 2020-05-21 | End: 2020-05-22 | Stop reason: HOSPADM

## 2020-05-21 RX ORDER — LIDOCAINE HYDROCHLORIDE 20 MG/ML
INJECTION, SOLUTION INFILTRATION; PERINEURAL AS NEEDED
Status: DISCONTINUED | OUTPATIENT
Start: 2020-05-21 | End: 2020-05-21 | Stop reason: HOSPADM

## 2020-05-21 RX ORDER — ASPIRIN 81 MG/1
81 TABLET ORAL DAILY
Status: DISCONTINUED | OUTPATIENT
Start: 2020-05-22 | End: 2020-05-22 | Stop reason: HOSPADM

## 2020-05-21 RX ORDER — ACETAMINOPHEN 325 MG/1
650 TABLET ORAL EVERY 4 HOURS PRN
Status: DISCONTINUED | OUTPATIENT
Start: 2020-05-21 | End: 2020-05-22 | Stop reason: HOSPADM

## 2020-05-21 RX ORDER — SODIUM CHLORIDE 0.9 % (FLUSH) 0.9 %
3 SYRINGE (ML) INJECTION EVERY 12 HOURS SCHEDULED
Status: DISCONTINUED | OUTPATIENT
Start: 2020-05-21 | End: 2020-05-21 | Stop reason: HOSPADM

## 2020-05-21 RX ORDER — FOLIC ACID 1 MG/1
1 TABLET ORAL DAILY
Status: DISCONTINUED | OUTPATIENT
Start: 2020-05-22 | End: 2020-05-22 | Stop reason: HOSPADM

## 2020-05-21 RX ORDER — ATORVASTATIN CALCIUM 20 MG/1
20 TABLET, FILM COATED ORAL DAILY
Status: DISCONTINUED | OUTPATIENT
Start: 2020-05-21 | End: 2020-05-21 | Stop reason: SDUPTHER

## 2020-05-21 RX ADMIN — ATORVASTATIN CALCIUM 40 MG: 20 TABLET, FILM COATED ORAL at 21:10

## 2020-05-21 RX ADMIN — SODIUM CHLORIDE 75 ML/HR: 9 INJECTION, SOLUTION INTRAVENOUS at 08:22

## 2020-05-22 ENCOUNTER — READMISSION MANAGEMENT (OUTPATIENT)
Dept: CALL CENTER | Facility: HOSPITAL | Age: 75
End: 2020-05-22

## 2020-05-22 VITALS
RESPIRATION RATE: 16 BRPM | TEMPERATURE: 98 F | WEIGHT: 163 LBS | SYSTOLIC BLOOD PRESSURE: 149 MMHG | DIASTOLIC BLOOD PRESSURE: 70 MMHG | BODY MASS INDEX: 23.34 KG/M2 | OXYGEN SATURATION: 98 % | HEIGHT: 70 IN | HEART RATE: 62 BPM

## 2020-05-22 LAB
ACT BLD: 197 SECONDS (ref 82–152)
ANION GAP SERPL CALCULATED.3IONS-SCNC: 12.2 MMOL/L (ref 5–15)
BUN BLD-MCNC: 31 MG/DL (ref 8–23)
BUN/CREAT SERPL: 9.7 (ref 7–25)
CALCIUM SPEC-SCNC: 9.7 MG/DL (ref 8.6–10.5)
CHLORIDE SERPL-SCNC: 97 MMOL/L (ref 98–107)
CHOLEST SERPL-MCNC: 136 MG/DL (ref 0–200)
CO2 SERPL-SCNC: 26.8 MMOL/L (ref 22–29)
CREAT BLD-MCNC: 3.19 MG/DL (ref 0.76–1.27)
DEPRECATED RDW RBC AUTO: 46.4 FL (ref 37–54)
ERYTHROCYTE [DISTWIDTH] IN BLOOD BY AUTOMATED COUNT: 14.6 % (ref 12.3–15.4)
GFR SERPL CREATININE-BSD FRML MDRD: 19 ML/MIN/1.73
GLUCOSE BLD-MCNC: 123 MG/DL (ref 65–99)
HBA1C MFR BLD: 5.8 % (ref 4.8–5.6)
HCT VFR BLD AUTO: 33.2 % (ref 37.5–51)
HCT VFR BLDA CALC: 33 % (ref 38–51)
HCT VFR BLDA CALC: 33 % (ref 38–51)
HDLC SERPL-MCNC: 40 MG/DL (ref 40–60)
HGB BLD-MCNC: 11.2 G/DL (ref 13–17.7)
HGB BLDA-MCNC: 11.2 G/DL (ref 12–17)
HGB BLDA-MCNC: 11.2 G/DL (ref 12–17)
LDLC SERPL CALC-MCNC: 79 MG/DL (ref 0–100)
LDLC/HDLC SERPL: 1.99 {RATIO}
MCH RBC QN AUTO: 29.7 PG (ref 26.6–33)
MCHC RBC AUTO-ENTMCNC: 33.7 G/DL (ref 31.5–35.7)
MCV RBC AUTO: 88.1 FL (ref 79–97)
PLATELET # BLD AUTO: 164 10*3/MM3 (ref 140–450)
PMV BLD AUTO: 11.1 FL (ref 6–12)
POTASSIUM BLD-SCNC: 4.1 MMOL/L (ref 3.5–5.2)
RBC # BLD AUTO: 3.77 10*6/MM3 (ref 4.14–5.8)
SAO2 % BLDA: 73 % (ref 95–98)
SAO2 % BLDA: 92 % (ref 95–98)
SODIUM BLD-SCNC: 136 MMOL/L (ref 136–145)
TRIGL SERPL-MCNC: 83 MG/DL (ref 0–150)
VLDLC SERPL-MCNC: 16.6 MG/DL (ref 5–40)
WBC NRBC COR # BLD: 6.6 10*3/MM3 (ref 3.4–10.8)

## 2020-05-22 PROCEDURE — 99217 PR OBSERVATION CARE DISCHARGE MANAGEMENT: CPT | Performed by: INTERNAL MEDICINE

## 2020-05-22 PROCEDURE — 93010 ELECTROCARDIOGRAM REPORT: CPT | Performed by: INTERNAL MEDICINE

## 2020-05-22 PROCEDURE — 83036 HEMOGLOBIN GLYCOSYLATED A1C: CPT | Performed by: INTERNAL MEDICINE

## 2020-05-22 PROCEDURE — 85027 COMPLETE CBC AUTOMATED: CPT | Performed by: INTERNAL MEDICINE

## 2020-05-22 PROCEDURE — G0378 HOSPITAL OBSERVATION PER HR: HCPCS

## 2020-05-22 PROCEDURE — 93005 ELECTROCARDIOGRAM TRACING: CPT | Performed by: INTERNAL MEDICINE

## 2020-05-22 PROCEDURE — 80048 BASIC METABOLIC PNL TOTAL CA: CPT | Performed by: INTERNAL MEDICINE

## 2020-05-22 PROCEDURE — 80061 LIPID PANEL: CPT | Performed by: INTERNAL MEDICINE

## 2020-05-22 RX ORDER — CLOPIDOGREL BISULFATE 75 MG/1
75 TABLET ORAL DAILY
Qty: 90 TABLET | Refills: 2 | Status: SHIPPED | OUTPATIENT
Start: 2020-05-22 | End: 2022-10-24

## 2020-05-22 RX ADMIN — ASPIRIN 81 MG: 81 TABLET, COATED ORAL at 08:57

## 2020-05-22 RX ADMIN — FOLIC ACID 1 MG: 1 TABLET ORAL at 08:57

## 2020-05-22 RX ADMIN — CLOPIDOGREL 75 MG: 75 TABLET, FILM COATED ORAL at 08:56

## 2020-05-22 NOTE — OUTREACH NOTE
Prep Survey      Responses   [facility] facility patient discharged from?  [place]   Is LACE score < 7 ?  No   Eligibility  [facility]   Date of Admission  05/21/20   Date of Discharge  05/22/20   Discharge Disposition  Home or Self Care   Discharge diagnosis  s/p heart cath and PCI   COVID-19 Test Status  Negative   Does the patient have one of the following disease processes/diagnoses(primary or secondary)?  Cardiothoracic surgery   Does the patient have Home health ordered?  No   Is there a DME ordered?  No   Prep survey completed?  Yes          Jennifer Doty, RN

## 2020-05-25 ENCOUNTER — TRANSITIONAL CARE MANAGEMENT TELEPHONE ENCOUNTER (OUTPATIENT)
Dept: CALL CENTER | Facility: HOSPITAL | Age: 75
End: 2020-05-25

## 2020-05-25 NOTE — OUTREACH NOTE
Call Center TCM Note      Responses   [facility] patient discharged from?  [place]   Does the patient have one of the following disease processes/diagnoses(primary or secondary)?  Cardiothoracic surgery   TCM attempt successful?  Yes   Call start time  1530   Call end time  1541   Discharge diagnosis  s/p heart cath and PCI   Meds reviewed with patient/caregiver?  Yes   Is the patient having any side effects they believe may be caused by any medication additions or changes?  No   Does the patient have all medications related to this admission filled (includes all antibiotics, pain medications, cardiac medications, etc.)  Yes   Is the patient taking all medications as directed (includes completed medication regime)?  Yes   Comments regarding appointments  Has followup with Dr Jamil in June   Does the patient have a primary care provider?   Yes   Does the patient have an appointment scheduled with their C/T surgeon?  Yes   Comments regarding PCP  PCP:  Stevie Quiñones, DO- patient declines video visit- wants to go into office   Has the patient kept scheduled appointments due by today?  N/A   Psychosocial issues?  No   Comments  Patient reports he is doing well. Denies SOB or fever.    Did the patient receive a copy of their discharge instructions?  Yes   Nursing interventions  Reviewed instructions with patient   What is the patient's perception of their health status since discharge?  Improving   Nursing interventions  Nurse provided patient education   Is the patient/caregiver able to teach back normal signs of recovery?  Pain or discomfort at incisional site   Nursing interventions  Reassured on normal signs of recovery   Is the patient /caregiver able to teach back basic post-op care?  Practice 'cough and deep breath', Drive as instructed by MD in discharge instructions, Take showers only when approved by MD-sponge bathe until then, No tub bath, swimming, or hot tub until instructed by MD, Keep incision  areas clean, dry and protected, Lifting as instructed by MD in discharge instructions   Is the patient/caregiver able to teach back signs and symptoms of incisional infection?  Increased redness, swelling or pain at the incisonal site, Incisional warmth, Fever, Pus or odor from incision, Increased drainage or bleeding   Is the patient/caregiver able to teach back steps to recovery at home?  Set small, achievable goals for return to baseline health, Rest and rebuild strength, gradually increase activity, Make a list of questions for surgeon's appointment   Is the patient/caregiver able to teach back the hierarchy of who to call/visit for symptoms/problems? PCP, Specialist, Home health nurse, Urgent Care, ED, 911  Yes   TCM call completed?  Yes          Max Reeder RN    5/25/2020, 15:42

## 2020-05-29 LAB
ACT BLD: 180 SECONDS (ref 82–152)
ACT BLD: 213 SECONDS (ref 82–152)

## 2020-06-02 ENCOUNTER — READMISSION MANAGEMENT (OUTPATIENT)
Dept: CALL CENTER | Facility: HOSPITAL | Age: 75
End: 2020-06-02

## 2020-06-02 NOTE — OUTREACH NOTE
CT Surgery Week 2 Survey      Responses   [facility] patient discharged from?  [place]   Does the patient have one of the following disease processes/diagnoses(primary or secondary)?  Cardiothoracic surgery   Week 2 attempt successful?  Yes   Call start time  0830   Call end time  0840   Discharge diagnosis  s/p heart cath and PCI   Is patient permission given to speak with other caregiver?  No   Meds reviewed with patient/caregiver?  Yes   Is the patient having any side effects they believe may be caused by any medication additions or changes?  No   Does the patient have all medications related to this admission filled (includes all antibiotics, pain medications, cardiac medications, etc.)  Yes   Is the patient taking all medications as directed (includes completed medication regime)?  Yes   Comments regarding appointments  Has followup with Dr Jamil in June   Does the patient have a primary care provider?   Yes   Does the patient have an appointment scheduled with their C/T surgeon?  Yes   Comments regarding PCP  He is going to call and see him in office.    Has the patient kept scheduled appointments due by today?  N/A   Psychosocial issues?  No   Comments  He states he is doing great.   Did the patient receive a copy of their discharge instructions?  Yes   Nursing interventions  Reviewed instructions with patient   What is the patient's perception of their health status since discharge?  Improving   Nursing interventions  Nurse provided patient education   Nursing interventions  Reassured on normal signs of recovery   Is the patient /caregiver able to teach back basic post-op care?  Practice 'cough and deep breath', Drive as instructed by MD in discharge instructions, Take showers only when approved by MD-sponge bathe until then, No tub bath, swimming, or hot tub until instructed by MD, Keep incision areas clean, dry and protected, Lifting as instructed by MD in discharge instructions   Is the  patient/caregiver able to teach back signs and symptoms of incisional infection?  Increased redness, swelling or pain at the incisonal site, Incisional warmth, Fever, Pus or odor from incision, Increased drainage or bleeding   Is the patient/caregiver able to teach back steps to recovery at home?  Set small, achievable goals for return to baseline health, Rest and rebuild strength, gradually increase activity, Make a list of questions for surgeon's appointment   Is the patient/caregiver able to teach back the hierarchy of who to call/visit for symptoms/problems? PCP, Specialist, Home health nurse, Urgent Care, ED, 911  Yes   Week 2 call completed?  Yes          Vivian Paez RN

## 2020-06-08 ENCOUNTER — READMISSION MANAGEMENT (OUTPATIENT)
Dept: CALL CENTER | Facility: HOSPITAL | Age: 75
End: 2020-06-08

## 2020-06-08 NOTE — OUTREACH NOTE
CT Surgery Week 3 Survey      Responses   [facility] patient discharged from?  [place]   Does the patient have one of the following disease processes/diagnoses(primary or secondary)?  Cardiothoracic surgery   Week 3 attempt successful?  Yes   Call start time  1349   Call end time  1358   Discharge diagnosis  s/p heart cath and PCI   Meds reviewed with patient/caregiver?  Yes   Is the patient taking all medications as directed (includes completed medication regime)?  No   What is preventing the patient from taking all medications as directed?  Other   Nursing Interventions  Advised patient to call provider, Nurse provided patient education   Medication comments  Patient is not taking Plavix. He hasnt taken it in about 2 weeks.  He says he had a nosebleed that lasted 2 days and he stopped it. Education purpose of Plavix to prevent reocclusion. Very important medication. Did not call and discuss with MD.  Advised to call PCP/Cardiologist.   Has the patient kept scheduled appointments due by today?  N/A   Comments  Says he is doing great   What is the patient's perception of their health status since discharge?  Improving   Week 3 call completed?  Yes          Courtney Harvey RN

## 2020-06-16 ENCOUNTER — READMISSION MANAGEMENT (OUTPATIENT)
Dept: CALL CENTER | Facility: HOSPITAL | Age: 75
End: 2020-06-16

## 2020-06-16 NOTE — OUTREACH NOTE
CT Surgery Week 4 Survey      Responses   [facility] patient discharged from?  [place]   Does the patient have one of the following disease processes/diagnoses(primary or secondary)?  Cardiothoracic surgery   Week 4 attempt successful?  Yes   Call start time  1535   Call end time  1536   Meds reviewed with patient/caregiver?  Yes   Is the patient taking all medications as directed (includes completed medication regime)?  Yes   Has the patient kept scheduled appointments due by today?  Yes   Is the patient still receiving Home Health Services?  N/A   What is the patient's perception of their health status since discharge?  Improving   Week 4 Call Completed?  Yes   Would the patient like one additional call?  No   Graduated  Yes   Did the patient feel the follow up calls were helpful during their recovery period?  Yes   Was the number of calls appropriate?  Yes   Wrap up additional comments  Doing well, no needs.          Traci Us, RN

## 2020-10-22 ENCOUNTER — TELEPHONE (OUTPATIENT)
Dept: CARDIOLOGY | Facility: CLINIC | Age: 75
End: 2020-10-22

## 2020-10-22 NOTE — TELEPHONE ENCOUNTER
There is nothing specific from a heart standpoint.  He should touch base with his PCP.  Agree with appointment moved 3 weeks

## 2020-10-22 NOTE — TELEPHONE ENCOUNTER
I left a voicemail for the patient to follow up with his PCP for covid and that from a cardiac standpoint there is nothing specific that cn be done. I also advised that he should go to the ED if his SOA seams to get a little worse and to not wait for the SOA to get to the point he can hardly breath.

## 2020-10-22 NOTE — TELEPHONE ENCOUNTER
I spoke with the patient and he states that he test positive for covid a week ago. He would like to know if there is anything that he needs to do. I did advise him to reschedule his  Appointment for tomorrow for 3 weeks out. Is there anything he needs to do cardiac wise. I also advised him to follow up with his PCP as they said he has had some SOA.

## 2020-10-23 ENCOUNTER — TELEPHONE (OUTPATIENT)
Dept: CARDIOLOGY | Facility: CLINIC | Age: 75
End: 2020-10-23

## 2021-04-16 ENCOUNTER — OFFICE VISIT (OUTPATIENT)
Dept: NEUROLOGY | Facility: CLINIC | Age: 76
End: 2021-04-16

## 2021-04-16 VITALS
DIASTOLIC BLOOD PRESSURE: 50 MMHG | HEIGHT: 70 IN | BODY MASS INDEX: 24.34 KG/M2 | HEART RATE: 58 BPM | SYSTOLIC BLOOD PRESSURE: 118 MMHG | OXYGEN SATURATION: 96 % | WEIGHT: 170 LBS

## 2021-04-16 DIAGNOSIS — G62.9 NEUROPATHY: Primary | ICD-10-CM

## 2021-04-16 DIAGNOSIS — G60.9 HEREDITARY AND IDIOPATHIC NEUROPATHY, UNSPECIFIED: ICD-10-CM

## 2021-04-16 PROCEDURE — 99203 OFFICE O/P NEW LOW 30 MIN: CPT | Performed by: PSYCHIATRY & NEUROLOGY

## 2021-04-16 RX ORDER — HYDROCHLOROTHIAZIDE 25 MG/1
25 TABLET ORAL DAILY
COMMUNITY

## 2021-04-16 NOTE — ASSESSMENT & PLAN NOTE
75 year old man with neuropathic pain in both feet.  He also likely has ulnar neuropathy causing his symptoms in his right hand. I spoke with him about possible causes of neuropathy.  He is not diabetic.  I will check TSH and Vit B12 levels along with SPEP.  I will also refer him for EMG/NCS of bilateral lower extremities for further evaluation of the neuropathic pain.  Discussed treatment with oral pain medications including gabapentin or Lyrica but we will try lidocaine ointment instead at this time which is more local as oppose to systemic oral medicines which can cause side effects. Will follow up after testing.

## 2021-04-16 NOTE — PROGRESS NOTES
"Chief Complaint  Neurologic Problem (neuropathy x4yrs in bilateral feet and hands, has fallen frequently-)    Subjective          Lukasz Flynn presents to [facility] NEUROLOGY for   HISTORY OF PRESENT ILLNESS:    Lukasz Flynn is a 75 year old right handed man who presents to neurology clinic for initial evaluation and treatment of neuropathy.  His symptoms started 4 years ago with painful pinprick needle sensations starting in his feet which have progressed to his hands especially his pinky and ring finger on the right side.  He denies any family history of neuropathy.  He is not diabetic and his most recent HgbA1c was 5.8.  He reports drinking a drink 3-4 days per week and usually drinks LITs.  The pain is worse during the daytime and he tells me that if he bends his back in a fetal position then the pain goes away at night time when he is laying in bed.  He denies any back pain but tells me he had back surgery in 1988.  He denies ever getting chemotherapy.  He does have CKD and is currently on dialysis 2 days per week.      Past Medical History:   Diagnosis Date   • Acute bacterial endocarditis    • Acute pulmonary edema (CMS/[facility])    • Anemia    • Aneurysm of thoracic aorta (CMS/[facility])     Saccular aneurysm repaired 7/12   • Aortic valve insufficiency    • Atherosclerotic heart disease of native coronary artery without angina pectoris    • AV fistula (CMS/[facility])     RIGHT ARM   • Bruises easily    • Carotid artery stenosis    • Carotid bruit    • Chronic kidney disease    • ESRD (end stage renal disease) (CMS/[facility])     DIALYSIS  M-W-F   • Fracture of left radius    • Hypertension    • Ischemic cardiomyopathy    • Mini stroke (CMS/[facility]) 2011    \"SEVERAL\"   NO RESIDUAL   • Mixed hyperlipidemia    • MRSA (methicillin resistant staph aureus) culture positive 2012    LEFT ARM   • Myocardial infarction (CMS/[facility]) 2012   • PVD (peripheral vascular disease) (CMS/[facility])    • Renal disease    • Shortness of " breath    • Stroke (CMS/[facility])    • Venous thrombosis     Left upper extremity arteriovenous graft.        Family History   Problem Relation Age of Onset   • Heart disease Mother         valvular heart disease/mitral valv surg   • Malig Hyperthermia Neg Hx         Social History     Socioeconomic History   • Marital status:      Spouse name: Not on file   • Number of children: Not on file   • Years of education: Not on file   • Highest education level: Not on file   Tobacco Use   • Smoking status: Former Smoker     Packs/day: 1.00     Years: 55.00     Pack years: 55.00     Types: Cigarettes     Quit date: 2012     Years since quittin.2   • Smokeless tobacco: Never Used   • Tobacco comment: Caffeine use   Vaping Use   • Vaping Use: Never used   Substance and Sexual Activity   • Alcohol use: Yes     Alcohol/week: 1.0 - 2.0 standard drinks     Types: 1 - 2 Shots of liquor per week     Comment: occassional   • Drug use: No   • Sexual activity: Defer        I have personally reviewed the ROS as stated below.     Review of Systems   Constitutional: Negative for activity change, appetite change and fatigue.   HENT: Negative for hearing loss, trouble swallowing and voice change.    Eyes: Negative for blurred vision, double vision and redness.   Respiratory: Negative for choking, chest tightness and stridor.    Cardiovascular: Negative for chest pain, palpitations and leg swelling.   Gastrointestinal: Negative for abdominal pain, constipation, GERD and indigestion.   Endocrine: Negative for cold intolerance and heat intolerance.   Genitourinary: Negative for decreased urine volume, urgency and urinary incontinence.   Musculoskeletal: Positive for gait problem. Negative for back pain and neck stiffness.   Allergic/Immunologic: Negative for environmental allergies and food allergies.   Neurological: Positive for numbness (bilateral feet). Negative for dizziness, tremors, seizures, syncope, facial asymmetry, speech  "difficulty, weakness, light-headedness, headache, memory problem and confusion.   Hematological: Bruises/bleeds easily.   Psychiatric/Behavioral: Negative for agitation, behavioral problems, decreased concentration, dysphoric mood, hallucinations, self-injury, sleep disturbance, suicidal ideas, negative for hyperactivity, depressed mood and stress. The patient is not nervous/anxious.         Objective   Vital Signs:   /50 (BP Location: Left arm, Patient Position: Sitting)   Pulse 58   Ht 177.8 cm (70\")   Wt 77.1 kg (170 lb)   SpO2 96%   BMI 24.39 kg/m²       PHYSICAL EXAM:    General   Mental Status - Alert. General Appearance - Well developed, Well groomed, Oriented and Cooperative. Orientation - Oriented X3.       Head and Neck  Head - normocephalic, atraumatic with no lesions or palpable masses.  Neck    Global Assessment - supple.       Eye   Sclera/Conjunctiva - Bilateral - Normal.    ENMT  Mouth and Throat   Oral Cavity/Oropharynx: Oropharynx - the soft palate,uvula and tongue are normal in appearance.    Chest and Lung Exam   Chest - lung clear to auscultation bilaterally.    Cardiovascular   Cardiovascular examination reveals  - normal heart sounds, regular rate and rhythm.    Neurologic   Mental Status: Speech - Normal. Cognitive function - appropriate fund of knowledge. No impairment of attention, Impairment of concentration, impairment of long term memory or impairment of short term memory.  Cranial Nerves:   II Optic: Visual acuity - Left - Normal. Right - Normal. Visual fields - Normal (to confrontation).  III Oculomotor: Pupillary constriction - Left - Normal. Right - Normal.  VII Facial: - Normal Bilaterally.  VIII Acoustic - Bilateral - Hearing normal and (Hearing tested by finger rub).   IX Glossopharyngeal / X Vagus - Normal.  XI Accessory: Trapezius - Bilateral - Normal. Sternocleidomastoid - Bilateral - Normal.  XII Hypoglossal - Bilateral - Normal.  Eye Movements: - Normal " Bilaterally.  Sensory:   Light Touch: Intact - reduced in distal>proximal distribution in both legs.    Temperature sensation:  reduced in distal>proximal distribution in both legs and also his pinky and ring fingers (ulnar nerve distribution) on the right side.    Motor:   Bulk and Contour: - Normal.  Tone: - Normal.  Tremor: Not present.  Strength: 5/5 normal muscle strength - All Muscles.   General Assessment of Reflexes: - deep tendon reflexes are normal. Coordination - No Impairment of finger-to-nose or Impairment of rapid alternating movements. Gait - Normal.       Result Review :                 Assessment and Plan    Problem List Items Addressed This Visit        Neuro    Neuropathy - Primary    Current Assessment & Plan     75 year old man with neuropathic pain in both feet.  He also likely has ulnar neuropathy causing his symptoms in his right hand. I spoke with him about possible causes of neuropathy.  He is not diabetic.  I will check TSH and Vit B12 levels along with SPEP.  I will also refer him for EMG/NCS of bilateral lower extremities for further evaluation of the neuropathic pain.  Discussed treatment with oral pain medications including gabapentin or Lyrica but we will try lidocaine ointment instead at this time which is more local as oppose to systemic oral medicines which can cause side effects. Will follow up after testing.           Relevant Orders    EMG & Nerve Conduction Test    TSH    Vitamin B12    Protein Elec + Interp, Serum      Other Visit Diagnoses     Hereditary and idiopathic neuropathy, unspecified         Relevant Orders    EMG & Nerve Conduction Test    TSH              Follow Up   No follow-ups on file.  Patient was given instructions and counseling regarding his condition or for health maintenance advice. Please see specific information pulled into the AVS if appropriate.

## 2021-06-16 ENCOUNTER — APPOINTMENT (OUTPATIENT)
Dept: INFUSION THERAPY | Facility: HOSPITAL | Age: 76
End: 2021-06-16

## 2022-03-14 RX ORDER — SIMVASTATIN 40 MG
40 TABLET ORAL NIGHTLY
Qty: 90 TABLET | Refills: 0 | Status: SHIPPED | OUTPATIENT
Start: 2022-03-14 | End: 2022-07-20

## 2022-03-14 NOTE — TELEPHONE ENCOUNTER
Patient wife called and stated that Patient is completely out of his simvastatin and was told by the pharmacy to contact our office. Patient has not been seen in office since 2020. His last appointment was scheduled for 10/23/2020, However he no showed due to having Covid. His last lipid panel was 5/22/2020.   Is this ok to fill enough to hold him over until he can be seen In office?     Please advise.

## 2022-03-15 NOTE — TELEPHONE ENCOUNTER
Pt is schedule. Pt states he is out of hydroCHLOROthiazide (HYDRODIURIL) 25 MG tablet and does he still need to take it? Thanks

## 2022-03-16 ENCOUNTER — TELEPHONE (OUTPATIENT)
Dept: CARDIOLOGY | Facility: CLINIC | Age: 77
End: 2022-03-16

## 2022-03-16 NOTE — TELEPHONE ENCOUNTER
Thank you Gaby Bradley- please let him know since we have not seen him in a while, we will not fill HCTZ. Does he have a nephrologist? Given his renal function, have him check with nephrology or his PCP because I do not fill comfortable filling it given kidney function.

## 2022-03-16 NOTE — TELEPHONE ENCOUNTER
Called and left VM. Will continue to try to reach patient.     Lisbeth Hedrick RN  Triage [facility] [place]

## 2022-03-17 NOTE — TELEPHONE ENCOUNTER
Spoke with patient about Nola Johnston's recommendations. The patient verbalized understanding.     Lisbeth Hedrick RN  Triage [facility] [place]

## 2022-05-17 ENCOUNTER — OFFICE VISIT (OUTPATIENT)
Dept: CARDIOLOGY | Facility: CLINIC | Age: 77
End: 2022-05-17

## 2022-05-17 VITALS
DIASTOLIC BLOOD PRESSURE: 62 MMHG | WEIGHT: 165 LBS | BODY MASS INDEX: 23.62 KG/M2 | SYSTOLIC BLOOD PRESSURE: 160 MMHG | HEART RATE: 77 BPM | HEIGHT: 70 IN

## 2022-05-17 DIAGNOSIS — I10 ESSENTIAL HYPERTENSION: ICD-10-CM

## 2022-05-17 DIAGNOSIS — E78.2 MIXED HYPERLIPIDEMIA: ICD-10-CM

## 2022-05-17 DIAGNOSIS — I25.10 CORONARY ARTERIOSCLEROSIS IN NATIVE ARTERY: Primary | ICD-10-CM

## 2022-05-17 PROCEDURE — 93000 ELECTROCARDIOGRAM COMPLETE: CPT | Performed by: NURSE PRACTITIONER

## 2022-05-17 PROCEDURE — 99214 OFFICE O/P EST MOD 30 MIN: CPT | Performed by: NURSE PRACTITIONER

## 2022-05-17 NOTE — PROGRESS NOTES
Date of Office Visit: 22  Encounter Provider: JOSUE Ingram  Place of Service: [facility] CARDIOLOGY  Patient Name: Lukasz Flynn  :1945    Chief Complaint   Patient presents with   • Coronary arteriosclerosis in native artery   • Aortic Aneurysm   • Hypertension   • Congestive Heart Failure   • Mixed Hyperlipidemia   • Follow-up   :     HPI: Lukasz Flynn is a 76 y.o. male  with coronary artery disease status post CABG, carotid artery stenosis status post left carotid endarterectomy, end-stage renal disease on dialysis, CVA, aortic valve endocarditis, history of MRSA, abdominal aortic aneurysm status post stent graft, cardiomyopathy, pulmonary nodules, hypertension, and hyperlipidemia.      He is followed by Dr. Rider.  I will visit with him for the first time have reviewed his medical record.    He has history of abnormal EKG in  and ejection fraction of 40-45% with left ventricular hypertrophy.  There were  he had left carotid endarterectomy and had some postoperative renal failure which required dialysis.  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  In spring 2012 he was admitted with MRSA bacteremia 9-ST elevation myocardial infarction.  Transesophageal echo showed no evidence of endocarditis, small patent foramen ovale and significant atherosclerotic disease throughout the entire aorta with multiple mobile plaques identified.  He was awaiting coronary bypass surgery and presented prior to his elective surgery date with chest pain and not-ST elevation myocardial infarction.  On 2012 he had four-vessel bypass including LIMA to LAD, vein graft to the right coronary artery, vein graft to the first marginal branch of the circumflex and vein graft to the second marginal branch of the circumflex.  He also had patch annuloplasty of the ascending aorta for saccular aneurysm.  Postoperatively, he did well.  In 2015 he complained  "of some spots in his vision and had an MRI of the brain which showed no acute infarct however he had evidence of tiny old infarcts.    In May 2020 he had drug-eluting stent to the saphenous vein graft of the OM1.  In October 2020 he had COVID and had a telehealth visit with our office.    He presents today for reassessment.  He has been doing well and is now on dialysis twice a week.  He denies chest pain tightness pressure, palpitation, near-syncope, syncope or edema.  He is accompanied by his wife today.  He is up-to-date with his vascular follow-up as well as nephrology.  He is overall been doing well      Allergies   Allergen Reactions   • Plavix [Clopidogrel] Other (See Comments)     Nose bleeds            Family and social history reviewed.     ROS  All other systems were reviewed and are negative          Objective:     Vitals:    05/17/22 0809   BP: 160/62   BP Location: Left arm   Patient Position: Sitting   Pulse: 77   Weight: 74.8 kg (165 lb)   Height: 177.8 cm (70\")     Body mass index is 23.68 kg/m².    PHYSICAL EXAM:  Pulmonary:      Breath sounds: Rhonchi present.      Comments: Scattered   Cardiovascular:      Normal rate. Regular rhythm.      Murmurs: There is a systolic murmur.           ECG 12 Lead    Date/Time: 5/17/2022 9:05 AM  Performed by: Nola Johnston APRN  Authorized by: Nola Johnston APRN   Comparison: compared with previous ECG   Similar to previous ECG  Rhythm: sinus rhythm  Ectopy: unifocal PVCs  Rate: normal  Comments: No significant changes               Current Outpatient Medications   Medication Sig Dispense Refill   • aspirin 81 MG tablet Take 81 mg by mouth Daily.     • B Complex-C-Folic Acid (RENAL) 1 MG capsule capsule Take 1 capsule by mouth Daily.     • folic acid (FOLVITE) 1 MG tablet Take 1 mg by mouth Daily.     • hydroCHLOROthiazide (HYDRODIURIL) 25 MG tablet Take 25 mg by mouth Daily.     • Lactobacillus (ACIDOPHILUS) 100 MG capsule Take 1 tablet by mouth daily.     • " metoprolol succinate XL (TOPROL-XL) 25 MG 24 hr tablet Take 25 mg by mouth every night.     • simvastatin (ZOCOR) 40 MG tablet Take 1 tablet by mouth Every Night. 90 tablet 0   • clopidogrel (PLAVIX) 75 MG tablet Take 1 tablet by mouth Daily. 90 tablet 2     No current facility-administered medications for this visit.     Assessment:      No diagnosis found.     No orders of the defined types were placed in this encounter.        Plan:       1. 76 year old gentleman with myocardial infarction, coronary artery disease status post coronary bypass grafting times 4 July 2012.  In May 2020 he had repeat cardiac catheterization and had drug-eluting stent to the proximal saphenous vein graft to the OM1.  No angina  2. Hypertension blood pressure is elevated but usually better after dialysis  3.  Ischemic cardiomyopathy  4.  Peripheral vascular disease status post left carotid endarterectomy follows with Dr. Gonzalo Lal  5.  Infrarenal abdominal aortic aneurysm status post stent graft  6.  End-stage renal disease on hemodialysis 2 x/ week and followed with Dr. Boom Mcgowan  7.  History of stroke  8.  History of MRSA bacteremia  9.  History of pulmonary nodules  10. Prediabetes   11. History of aortic valve endocarditis   12.  Hyperlipidemia reviewed lipid panel from October 2021 in Care Everywhere.  LDL 95.  Target LDL 70 or less  Follow-up in 1 year call with questions or concerns            It has been a pleasure to participate in this patient's care.      Thank you,  JOSUE Ingram      **I used Dragon to dictate this note:**

## 2022-07-20 DIAGNOSIS — E78.2 MIXED HYPERLIPIDEMIA: Primary | ICD-10-CM

## 2022-07-20 RX ORDER — SIMVASTATIN 40 MG
TABLET ORAL
Qty: 90 TABLET | Refills: 0 | Status: SHIPPED | OUTPATIENT
Start: 2022-07-20

## 2022-11-17 ENCOUNTER — OFFICE VISIT (OUTPATIENT)
Dept: FAMILY MEDICINE CLINIC | Facility: CLINIC | Age: 77
End: 2022-11-17

## 2022-11-17 VITALS
BODY MASS INDEX: 22.76 KG/M2 | TEMPERATURE: 98 F | OXYGEN SATURATION: 97 % | WEIGHT: 159 LBS | DIASTOLIC BLOOD PRESSURE: 80 MMHG | SYSTOLIC BLOOD PRESSURE: 118 MMHG | HEIGHT: 70 IN | HEART RATE: 79 BPM

## 2022-11-17 DIAGNOSIS — B02.29 POSTHERPETIC NEURALGIA: Primary | ICD-10-CM

## 2022-11-17 PROCEDURE — 99213 OFFICE O/P EST LOW 20 MIN: CPT | Performed by: FAMILY MEDICINE

## 2022-11-17 RX ORDER — GABAPENTIN 300 MG/1
300 CAPSULE ORAL DAILY
Qty: 30 CAPSULE | Refills: 1 | Status: SHIPPED | OUTPATIENT
Start: 2022-11-17

## 2022-11-17 NOTE — PROGRESS NOTES
Subjective   Lukasz Flynn is a 77 y.o. male with   Chief Complaint   Patient presents with   • Herpes Zoster     Here for follow up, right arm and chest   .    History of Present Illness   77-year-old white male with recent bout of shingles to the left anterior chest and down left arm.  This was diagnosed in another clinic patient was started on Valtrex as well as prednisone.  Skin lesions have since resolved and patient is left with significant pain to the anterior left chest and into the left upper arm.  He is looking for some relief.  Patient is a dialysis patient and creatinine clearance is in the 19 range.  Other current medications include hydrochlorothiazide, Toprol-XL as well as simvastatin.  He does use 81 mg aspirin daily as well as folic acid.  He is followed by nephrology.  The following portions of the patient's history were reviewed and updated as appropriate: allergies, current medications, past family history, past medical history, past social history, past surgical history and problem list.    Review of Systems   Constitutional: Positive for fatigue. Negative for fever.   HENT: Positive for rhinorrhea. Negative for congestion and sore throat.    Eyes: Negative for pain.   Respiratory: Positive for shortness of breath. Negative for cough.    Gastrointestinal: Negative for diarrhea and vomiting.   Skin: Positive for rash.   Neurological:        Postherpetic neuralgia       Objective     Vitals:    11/17/22 1446   BP: 118/80   Pulse: 79   Temp: 98 °F (36.7 °C)   SpO2: 97%       No results found for this or any previous visit (from the past 672 hour(s)).    Physical Exam  Vitals and nursing note reviewed.   Constitutional:       Appearance: Normal appearance. He is well-developed and well-groomed.   HENT:      Head: Normocephalic and atraumatic.   Neck:      Thyroid: No thyroid mass or thyromegaly.      Vascular: Normal carotid pulses. No carotid bruit.      Trachea: Trachea and phonation normal.    Cardiovascular:      Rate and Rhythm: Normal rate and regular rhythm.      Heart sounds: Normal heart sounds. No murmur heard.    No friction rub. No gallop.   Pulmonary:      Effort: Pulmonary effort is normal. No respiratory distress.      Breath sounds: Normal breath sounds. No decreased breath sounds, wheezing, rhonchi or rales.   Musculoskeletal:      Cervical back: Neck supple.   Lymphadenopathy:      Cervical: No cervical adenopathy.   Skin:     General: Skin is warm and dry.      Findings: No rash.      Comments: No evidence of skin lesion in the area of concern-anterior chest or left upper extremity.   Neurological:      Mental Status: He is alert and oriented to person, place, and time.   Psychiatric:         Attention and Perception: Attention and perception normal.         Mood and Affect: Mood and affect normal.         Speech: Speech normal.         Behavior: Behavior normal. Behavior is cooperative.         Thought Content: Thought content normal.         Cognition and Memory: Cognition and memory normal.         Judgment: Judgment normal.         Assessment & Plan   Diagnoses and all orders for this visit:    1. Postherpetic neuralgia (Primary)  -     gabapentin (NEURONTIN) 300 MG capsule; Take 1 capsule by mouth Daily.  Dispense: 30 capsule; Refill: 1    Based on a creatinine clearance of 19 patient is only able to use 300 mg of gabapentin 1 time per day.  The range is 200 to 700/day.  We will start the above dose and see how he responds over the next 7 to 10 days.  We will follow-up with nephrology and has been invited to contact his nephrologist in regards to the prescription of this product.    Return if symptoms worsen or fail to improve.

## 2022-11-21 ENCOUNTER — TELEPHONE (OUTPATIENT)
Dept: FAMILY MEDICINE CLINIC | Facility: CLINIC | Age: 77
End: 2022-11-21

## 2022-11-21 DIAGNOSIS — B02.29 POSTHERPETIC NEURALGIA: Primary | ICD-10-CM

## 2022-11-21 RX ORDER — PREGABALIN 75 MG/1
75 CAPSULE ORAL 2 TIMES DAILY
Qty: 60 CAPSULE | Refills: 3 | Status: SHIPPED | OUTPATIENT
Start: 2022-11-21

## 2022-11-21 NOTE — TELEPHONE ENCOUNTER
Caller: Maine Flynn    Relationship: Emergency Contact    Best call back number: [phone]    What medications are you currently taking:   Current Outpatient Medications on File Prior to Visit   Medication Sig Dispense Refill   • aspirin 81 MG tablet Take 81 mg by mouth Daily.     • B Complex-C-Folic Acid (RENAL) 1 MG capsule capsule Take 1 capsule by mouth Daily.     • folic acid (FOLVITE) 1 MG tablet Take 1 mg by mouth Daily.     • gabapentin (NEURONTIN) 300 MG capsule Take 1 capsule by mouth Daily. 30 capsule 1   • hydroCHLOROthiazide (HYDRODIURIL) 25 MG tablet Take 25 mg by mouth Daily.     • Lactobacillus (ACIDOPHILUS) 100 MG capsule Take 1 tablet by mouth daily.     • metoprolol succinate XL (TOPROL-XL) 25 MG 24 hr tablet Take 25 mg by mouth every night.     • simvastatin (ZOCOR) 40 MG tablet TAKE ONE TABLET BY MOUTH ONCE NIGHTLY 90 tablet 0     No current facility-administered medications on file prior to visit.        When did you start taking these medications: 11/18/22    Which medication are you concerned about: GABAPENTIN 300MG    Who prescribed you this medication: DR HAYS    What are your concerns: PATIENT STATES HE IS DIZZY, THIS IS CAUSING HIM TO FALL OVER, HE ALSO DOES NOT BELIEVE THIS IS HELPING THE NERVE PAIN    How long have you had these concerns: 11/18/22 COUPLE HOURS AFTER STARTING THIS.

## 2022-11-21 NOTE — TELEPHONE ENCOUNTER
PT DOESNT BELIEVE THE GABAPENTIN IS HELPING HIS NERVE PAIN ALSO CAUSING HIM TO FEEL DIZZY, FALLING OVER WHEN TRYING TO WALK.  please ADVISE.

## 2023-05-24 ENCOUNTER — OFFICE VISIT (OUTPATIENT)
Dept: CARDIOLOGY | Facility: CLINIC | Age: 78
End: 2023-05-24
Payer: MEDICARE

## 2023-05-24 VITALS
BODY MASS INDEX: 22.19 KG/M2 | HEIGHT: 70 IN | DIASTOLIC BLOOD PRESSURE: 80 MMHG | OXYGEN SATURATION: 98 % | RESPIRATION RATE: 16 BRPM | WEIGHT: 155 LBS | SYSTOLIC BLOOD PRESSURE: 122 MMHG | HEART RATE: 77 BPM

## 2023-05-24 DIAGNOSIS — E78.2 MIXED HYPERLIPIDEMIA: ICD-10-CM

## 2023-05-24 DIAGNOSIS — I65.22 CAROTID ARTERY STENOSIS, SYMPTOMATIC, LEFT: ICD-10-CM

## 2023-05-24 DIAGNOSIS — R06.09 DOE (DYSPNEA ON EXERTION): ICD-10-CM

## 2023-05-24 DIAGNOSIS — I71.43 INFRARENAL ABDOMINAL AORTIC ANEURYSM (AAA) WITHOUT RUPTURE: ICD-10-CM

## 2023-05-24 DIAGNOSIS — I25.10 CORONARY ARTERIOSCLEROSIS IN NATIVE ARTERY: Primary | ICD-10-CM

## 2023-05-24 DIAGNOSIS — I10 ESSENTIAL HYPERTENSION: ICD-10-CM

## 2023-05-24 NOTE — PROGRESS NOTES
CARDIOLOGY    Jane Rider MD    ENCOUNTER DATE:  05/24/2023    Lukasz Flynn / 77 y.o. / male        CHIEF COMPLAINT / REASON FOR OFFICE VISIT     Heart Problem (1  year follow up/Former GM )      HISTORY OF PRESENT ILLNESS       HPI    Lukasz Flynn is a 77 y.o. male     This is a gentleman who had a decrease in ejection fraction on a stress test in 01/2011. He had a heart catheterization in 02/2011, which showed significant 3-vessel coronary disease. He was found to have significant carotid disease while in the hospital. He had an unsuccessful carotid stent and in 02/2011 he had a left carotid endarterectomy. In 03/2011 he came in with renal failure, which required dialysis. 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    He was hospitalized in the spring of 2012 with MRSA bacteremia and non-ST elevation myocardial infarction. Repeat heart catheterization in 2012 continued to show significant 3-vessel disease. A SCOTT in 04/2012 did not show evidence of endocarditis, but did show a small patent foramen ovale and significant atherosclerotic disease throughout the aorta with multiple mobile plaques identified. He had an emergent bypass after a non-ST elevation myocardial infarction on 06/30/2012. He had a 4-vessel bypass with LIMA to the LAD, vein graft to the right coronary artery, vein graft to the first marginal branch of the circumflex and vein graft to the second marginal branch of the circumflex. He had a patch angioplasty of the ascending aorta for saccular aneurysm. About 3 years went by before he came back to see me in 04/2015. In 04/2015 he had a normal 48-hour Holter monitor. Echocardiogram in 04/2015 showed normal LV function with ejection fraction of 57%, grade 1A diastolic dysfunction. Mild left atrial enlargement. No significant valve disease. MRI of the brain showed no acute infarcts.  There were small old infarcts as well as some hemosiderin deposits, likely some old micro hemorrhages in the past.     I had a telephone visit with him in 04/2020. He was complaining of a lot of shortness of breath and orthopnea. His ejection fraction had dropped down to about 30% based on an echocardiogram at [facility]. He had a heart catheterization in 05/2020. See results below. His ejection fraction via heart catheterization was about 20%. There was no significant mitral regurgitation. He had native 3-vessel disease with the LIMA to the LAD, vein graft to the PDA is patent with 30% proximal PDA disease. Vein graft to the first obtuse marginal has 90% lesion proximal portion. The first obtuse marginal branch was good. The fourth vein graft was occluded at the aortic anastomosis. Drug eluding stent was placed in the vein graft to the obtuse marginal branch. He did not return for repeat echocardiogram to reassess his ejection fraction.     His main complaint is shortness of breath and cough.  He says he is very short of breath with exertion.  He denies palpitations, edema or chest pain.  He is on dialysis 2 times a week.    REVIEW OF SYSTEMS     Review of Systems   Constitutional: Negative for chills, fever, weight gain and weight loss.   Cardiovascular: Negative for leg swelling.   Respiratory: Positive for cough. Negative for snoring and wheezing.    Hematologic/Lymphatic: Negative for bleeding problem. Does not bruise/bleed easily.   Skin: Negative for color change.   Musculoskeletal: Negative for falls, joint pain and myalgias.   Gastrointestinal: Negative for melena.   Genitourinary: Negative for hematuria.   Neurological: Negative for excessive daytime sleepiness.   Psychiatric/Behavioral: Negative for depression. The patient is not nervous/anxious.          VITAL SIGNS     Visit Vitals  /80 (BP Location: Left arm, Patient Position: Sitting, Cuff Size: Adult)   Pulse 77   Resp 16   Ht  "177.8 cm (70\")   Wt 70.3 kg (155 lb)   SpO2 98%   BMI 22.24 kg/m²         Wt Readings from Last 3 Encounters:   05/24/23 70.3 kg (155 lb)   11/17/22 72.1 kg (159 lb)   10/24/22 70.3 kg (155 lb)     Body mass index is 22.24 kg/m².      PHYSICAL EXAMINATION     Constitutional:       General: Not in acute distress.  Neck:      Vascular: No carotid bruit or JVD.   Pulmonary:      Effort: Pulmonary effort is normal.      Breath sounds: Normal breath sounds.   Cardiovascular:      Normal rate. Regular rhythm.      Murmurs: There is no murmur.   Psychiatric:         Mood and Affect: Mood and affect normal.           REVIEWED DATA       ECG 12 Lead    Date/Time: 5/24/2023 9:49 AM  Performed by: Jane Rider MD  Authorized by: Jane Rider MD   Comparison: compared with previous ECG from 5/17/2022  Similar to previous ECG  Rhythm: sinus rhythm  BPM: 77  Conduction: conduction normal  Other findings: left ventricular hypertrophy with strain    Clinical impression: abnormal EKG                  Lab Results   Component Value Date    GLUCOSE 123 (H) 05/22/2020    BUN 31 (H) 05/22/2020    CREATININE 3.19 (H) 05/22/2020    BCR 9.7 05/22/2020    K 4.1 05/22/2020    CO2 26.8 05/22/2020    CALCIUM 9.7 05/22/2020    PROTENTOTREF 7.0 03/17/2015    ALBUMIN 3.70 02/27/2018    BILITOT 0.7 02/27/2018    AST 44 (H) 02/27/2018    ALT 35 02/27/2018       ASSESSMENT & PLAN      Diagnosis Plan   1. Coronary arteriosclerosis in native artery  Adult Transthoracic Echo Complete W/ Cont if Necessary Per Protocol      2. Mixed hyperlipidemia  Adult Transthoracic Echo Complete W/ Cont if Necessary Per Protocol      3. Infrarenal abdominal aortic aneurysm (AAA) without rupture  Adult Transthoracic Echo Complete W/ Cont if Necessary Per Protocol      4. Essential hypertension  Adult Transthoracic Echo Complete W/ Cont if Necessary Per Protocol      5. Carotid artery stenosis, symptomatic, left  Adult Transthoracic Echo Complete W/ Cont if " Necessary Per Protocol      6. ROBERTS (dyspnea on exertion)  Adult Transthoracic Echo Complete W/ Cont if Necessary Per Protocol        1.  Coronary artery disease.  He had a CABG x4 in July 2022.  He had a repeat heart catheterization in May 2020.  One of his vein grafts was occluded at the ostium.  He had a significant lesion in the vein graft to the obtuse marginal branch which was stented.  LIMA to the LAD and vein graft to the PDA were patent.  He is on aspirin and statin.  He does have a history of a non-STEMI in 2012 and is on a beta-blocker.  2.  Ischemic cardiomyopathy.  His ejection fraction improved to 57% after his CABG.  Prior to his stent and echo at [facility] but his ejection fraction about 30%.  His ejection fraction during the cath was felt to be about 20%.  He has not had a repeat echo since that time.  Noncompliance has been an issue.  I have placed orders for an echocardiogram today.   3. Peripheral vascular disease status post left carotid endarterectomy.    4. Infra-abdominal aortic aneurysm s/p stent graft.    5.  Abnormal ABIs in March 2020 with minimal to mild arterial occlusive disease of the right leg.  Not currently complaining of claudication symptoms.  6. Hypertension controlled. Controlled with medication and hemodialysis.  7. Hyperlipidemia. Tolerating simvastatin  8. History of pulmonary nodules.   9. End-stage renal disease. He is on hemodialysis.   10. History of stroke.   11. History of poor nutrition with a history of G-tube but he is no longer having issues with that.   12. History of Methicillin-resistant Staphylococcus aureus bacteremia.      He tells me that he is moving to [facility].  I encouraged him to find a cardiologist as soon as he gets down there.  1 my nurses or I will go over the results of his echo when it is available      Orders Placed This Encounter   Procedures   • ECG 12 Lead     This order was created via procedure documentation     Order Specific Question:    Release to patient     Answer:   Routine Release   • Adult Transthoracic Echo Complete W/ Cont if Necessary Per Protocol     Standing Status:   Future     Standing Expiration Date:   5/23/2024     Order Specific Question:   Reason for exam?     Answer:   Dyspnea           MEDICATIONS         Discharge Medications          Accurate as of May 24, 2023  9:54 AM. If you have any questions, ask your nurse or doctor.            Continue These Medications      Instructions Start Date   Acidophilus 100 MG capsule   1 tablet, Oral, Daily      aspirin 81 MG tablet   81 mg, Oral, Daily      folic acid 1 MG tablet  Commonly known as: FOLVITE   1 mg, Oral, Daily      hydroCHLOROthiazide 25 MG tablet  Commonly known as: HYDRODIURIL   25 mg, Oral, Daily      metoprolol succinate XL 25 MG 24 hr tablet  Commonly known as: TOPROL-XL   25 mg, Oral, Nightly      Renal 1 MG capsule capsule   1 capsule, Oral, Daily      simvastatin 40 MG tablet  Commonly known as: ZOCOR   TAKE ONE TABLET BY MOUTH ONCE NIGHTLY         Stop These Medications    gabapentin 300 MG capsule  Commonly known as: NEURONTIN  Stopped by: Jane Rider MD     pregabalin 75 MG capsule  Commonly known as: Lyrica  Stopped by: MD Jane Kumari MD  05/24/23  09:54 EDT    Part of this note may be an electronic transcription/translation of spoken language to printed text using the Dragon dictation system.

## 2023-05-31 ENCOUNTER — HOSPITAL ENCOUNTER (OUTPATIENT)
Dept: CARDIOLOGY | Facility: HOSPITAL | Age: 78
Discharge: HOME | End: 2023-05-31
Admitting: INTERNAL MEDICINE

## 2023-05-31 ENCOUNTER — OFFICE VISIT (OUTPATIENT)
Dept: FAMILY MEDICINE CLINIC | Facility: CLINIC | Age: 78
End: 2023-05-31

## 2023-05-31 VITALS
WEIGHT: 155 LBS | HEART RATE: 70 BPM | BODY MASS INDEX: 22.19 KG/M2 | OXYGEN SATURATION: 96 % | HEIGHT: 70 IN | DIASTOLIC BLOOD PRESSURE: 60 MMHG | SYSTOLIC BLOOD PRESSURE: 130 MMHG

## 2023-05-31 VITALS
DIASTOLIC BLOOD PRESSURE: 58 MMHG | OXYGEN SATURATION: 96 % | BODY MASS INDEX: 22.19 KG/M2 | HEART RATE: 59 BPM | TEMPERATURE: 97.6 F | SYSTOLIC BLOOD PRESSURE: 130 MMHG | HEIGHT: 70 IN | WEIGHT: 155 LBS

## 2023-05-31 DIAGNOSIS — I10 ESSENTIAL HYPERTENSION: ICD-10-CM

## 2023-05-31 DIAGNOSIS — I25.10 CORONARY ARTERIOSCLEROSIS IN NATIVE ARTERY: ICD-10-CM

## 2023-05-31 DIAGNOSIS — E78.2 MIXED HYPERLIPIDEMIA: ICD-10-CM

## 2023-05-31 DIAGNOSIS — J04.0 LARYNGITIS: Primary | ICD-10-CM

## 2023-05-31 DIAGNOSIS — I65.22 CAROTID ARTERY STENOSIS, SYMPTOMATIC, LEFT: ICD-10-CM

## 2023-05-31 DIAGNOSIS — I71.43 INFRARENAL ABDOMINAL AORTIC ANEURYSM (AAA) WITHOUT RUPTURE: ICD-10-CM

## 2023-05-31 DIAGNOSIS — R06.09 DOE (DYSPNEA ON EXERTION): ICD-10-CM

## 2023-05-31 LAB
AORTIC ARCH: 2.7 CM
ASCENDING AORTA: 3.5 CM
BH CV ECHO MEAS - ACS: 1.47 CM
BH CV ECHO MEAS - AO MAX PG: 9.9 MMHG
BH CV ECHO MEAS - AO MEAN PG: 5 MMHG
BH CV ECHO MEAS - AO ROOT DIAM: 3.8 CM
BH CV ECHO MEAS - AO V2 MAX: 157 CM/SEC
BH CV ECHO MEAS - AO V2 VTI: 33.6 CM
BH CV ECHO MEAS - AVA(I,D): 1.68 CM2
BH CV ECHO MEAS - EDV(CUBED): 219.1 ML
BH CV ECHO MEAS - EDV(MOD-SP2): 250 ML
BH CV ECHO MEAS - EDV(MOD-SP4): 193 ML
BH CV ECHO MEAS - EF(MOD-BP): 51.5 %
BH CV ECHO MEAS - EF(MOD-SP2): 55.6 %
BH CV ECHO MEAS - EF(MOD-SP4): 44.6 %
BH CV ECHO MEAS - ESV(CUBED): 142.7 ML
BH CV ECHO MEAS - ESV(MOD-SP2): 111 ML
BH CV ECHO MEAS - ESV(MOD-SP4): 107 ML
BH CV ECHO MEAS - FS: 13.3 %
BH CV ECHO MEAS - IVS/LVPW: 1.16 CM
BH CV ECHO MEAS - IVSD: 1.02 CM
BH CV ECHO MEAS - LAT PEAK E' VEL: 5.1 CM/SEC
BH CV ECHO MEAS - LV DIASTOLIC VOL/BSA (35-75): 103 CM2
BH CV ECHO MEAS - LV MASS(C)D: 231.2 GRAMS
BH CV ECHO MEAS - LV MAX PG: 2.7 MMHG
BH CV ECHO MEAS - LV MEAN PG: 2 MMHG
BH CV ECHO MEAS - LV SYSTOLIC VOL/BSA (12-30): 57.1 CM2
BH CV ECHO MEAS - LV V1 MAX: 82.3 CM/SEC
BH CV ECHO MEAS - LV V1 VTI: 17.3 CM
BH CV ECHO MEAS - LVIDD: 6 CM
BH CV ECHO MEAS - LVIDS: 5.2 CM
BH CV ECHO MEAS - LVOT AREA: 3.3 CM2
BH CV ECHO MEAS - LVOT DIAM: 2.04 CM
BH CV ECHO MEAS - LVPWD: 0.87 CM
BH CV ECHO MEAS - MED PEAK E' VEL: 3.9 CM/SEC
BH CV ECHO MEAS - MR MAX PG: 70.1 MMHG
BH CV ECHO MEAS - MR MAX VEL: 418.7 CM/SEC
BH CV ECHO MEAS - MV A DUR: 0.1 SEC
BH CV ECHO MEAS - MV A MAX VEL: 33.4 CM/SEC
BH CV ECHO MEAS - MV DEC SLOPE: 502.8 CM/SEC2
BH CV ECHO MEAS - MV DEC TIME: 0.13 MSEC
BH CV ECHO MEAS - MV E MAX VEL: 108 CM/SEC
BH CV ECHO MEAS - MV E/A: 3.2
BH CV ECHO MEAS - MV MAX PG: 7.5 MMHG
BH CV ECHO MEAS - MV MEAN PG: 2.6 MMHG
BH CV ECHO MEAS - MV P1/2T: 81 MSEC
BH CV ECHO MEAS - MV V2 VTI: 28.6 CM
BH CV ECHO MEAS - MVA(P1/2T): 2.7 CM2
BH CV ECHO MEAS - MVA(VTI): 1.97 CM2
BH CV ECHO MEAS - PA ACC TIME: 0.11 SEC
BH CV ECHO MEAS - PA PR(ACCEL): 27.9 MMHG
BH CV ECHO MEAS - PA V2 MAX: 69.5 CM/SEC
BH CV ECHO MEAS - PULM A REVS DUR: 0.09 SEC
BH CV ECHO MEAS - PULM A REVS VEL: 20.7 CM/SEC
BH CV ECHO MEAS - PULM DIAS VEL: 59.7 CM/SEC
BH CV ECHO MEAS - PULM S/D: 0.33
BH CV ECHO MEAS - PULM SYS VEL: 19.7 CM/SEC
BH CV ECHO MEAS - QP/QS: 0.74
BH CV ECHO MEAS - RAP SYSTOLE: 8 MMHG
BH CV ECHO MEAS - RV MAX PG: 0.86 MMHG
BH CV ECHO MEAS - RV V1 MAX: 46.3 CM/SEC
BH CV ECHO MEAS - RV V1 VTI: 9.3 CM
BH CV ECHO MEAS - RVOT DIAM: 2.4 CM
BH CV ECHO MEAS - RVSP: 50 MMHG
BH CV ECHO MEAS - SI(MOD-SP2): 74.2 ML/M2
BH CV ECHO MEAS - SI(MOD-SP4): 45.9 ML/M2
BH CV ECHO MEAS - SUP REN AO DIAM: 2 CM
BH CV ECHO MEAS - SV(LVOT): 56.5 ML
BH CV ECHO MEAS - SV(MOD-SP2): 139 ML
BH CV ECHO MEAS - SV(MOD-SP4): 86 ML
BH CV ECHO MEAS - SV(RVOT): 42.1 ML
BH CV ECHO MEAS - TAPSE (>1.6): 0.79 CM
BH CV ECHO MEAS - TR MAX PG: 42.1 MMHG
BH CV ECHO MEAS - TR MAX VEL: 324.6 CM/SEC
BH CV ECHO MEASUREMENTS AVERAGE E/E' RATIO: 24
BH CV XLRA - RV BASE: 4 CM
BH CV XLRA - RV LENGTH: 7.3 CM
BH CV XLRA - RV MID: 3.5 CM
BH CV XLRA - TDI S': 8.8 CM/SEC
LEFT ATRIUM VOLUME INDEX: 54.5 ML/M2
MAXIMAL PREDICTED HEART RATE: 142 BPM
SINUS: 3.5 CM
STJ: 2.9 CM
STRESS TARGET HR: 121 BPM

## 2023-05-31 PROCEDURE — 3078F DIAST BP <80 MM HG: CPT | Performed by: FAMILY MEDICINE

## 2023-05-31 PROCEDURE — 1159F MED LIST DOCD IN RCRD: CPT | Performed by: FAMILY MEDICINE

## 2023-05-31 PROCEDURE — 25510000001 PERFLUTREN (DEFINITY) 8.476 MG IN SODIUM CHLORIDE (PF) 0.9 % 10 ML INJECTION: Performed by: INTERNAL MEDICINE

## 2023-05-31 PROCEDURE — 99213 OFFICE O/P EST LOW 20 MIN: CPT | Performed by: FAMILY MEDICINE

## 2023-05-31 PROCEDURE — 93306 TTE W/DOPPLER COMPLETE: CPT

## 2023-05-31 PROCEDURE — 3075F SYST BP GE 130 - 139MM HG: CPT | Performed by: FAMILY MEDICINE

## 2023-05-31 PROCEDURE — 1160F RVW MEDS BY RX/DR IN RCRD: CPT | Performed by: FAMILY MEDICINE

## 2023-05-31 PROCEDURE — 93306 TTE W/DOPPLER COMPLETE: CPT | Performed by: INTERNAL MEDICINE

## 2023-05-31 RX ORDER — FEXOFENADINE HCL 180 MG/1
180 TABLET ORAL DAILY
Qty: 30 TABLET | Refills: 1 | Status: SHIPPED | OUTPATIENT
Start: 2023-05-31

## 2023-05-31 RX ORDER — MONTELUKAST SODIUM 10 MG/1
10 TABLET ORAL NIGHTLY
Qty: 30 TABLET | Refills: 1 | Status: SHIPPED | OUTPATIENT
Start: 2023-05-31

## 2023-05-31 RX ADMIN — PERFLUTREN 1.5 ML: 6.52 INJECTION, SUSPENSION INTRAVENOUS at 07:36

## 2023-05-31 NOTE — PROGRESS NOTES
"  Subjective   Lukasz Flynn is a 77 y.o. male who is here for   Chief Complaint   Patient presents with   • Cough     Times 5 weeks   • Heartburn   • Hoarse   .     History of Present Illness     Lukasz has had a scratchy throat for 5 to 6 weeks.  He has known seasonal allergies.  He tried Benadryl with no help  Mild cough.  No wheezing.  Some shortness of breath but this is not new.  No fever.  No one else is ill  He is on dialysis    The following portions of the patient's history were reviewed and updated as appropriate: allergies, current medications, past medical history, past social history, past surgical history and problem list.    Review of Systems    Objective   Vitals:    05/31/23 1103   BP: 130/58   Pulse: 59   Temp: 97.6 °F (36.4 °C)   SpO2: 96%   Weight: 70.3 kg (155 lb)   Height: 177.8 cm (70\")      Physical Exam  Vitals reviewed.   HENT:      Right Ear: Tympanic membrane normal.      Left Ear: Tympanic membrane normal.      Mouth/Throat:      Pharynx: No oropharyngeal exudate or posterior oropharyngeal erythema.   Cardiovascular:      Rate and Rhythm: Normal rate.   Pulmonary:      Effort: Pulmonary effort is normal.      Breath sounds: No rales.   Lymphadenopathy:      Cervical: No cervical adenopathy.   Neurological:      Mental Status: He is alert.         Assessment & Plan   Diagnoses and all orders for this visit:    1. Laryngitis (Primary)  -     montelukast (Singulair) 10 MG tablet; Take 1 tablet by mouth Every Night.  Dispense: 30 tablet; Refill: 1  -     fexofenadine (Allegra Allergy) 180 MG tablet; Take 1 tablet by mouth Daily.  Dispense: 30 tablet; Refill: 1      There are no Patient Instructions on file for this visit.    Medications Discontinued During This Encounter   Medication Reason   • B Complex-C-Folic Acid (RENAL) 1 MG capsule capsule *Therapy completed        No follow-ups on file.    Dr. Jose Owen  [place], [place]    "

## 2023-06-02 ENCOUNTER — TELEPHONE (OUTPATIENT)
Dept: CARDIOLOGY | Facility: CLINIC | Age: 78
End: 2023-06-02

## 2023-06-02 NOTE — TELEPHONE ENCOUNTER
Called and left VM. Will continue to try to reach patient.     Lisbeth Hedrick RN  Triage [facility] [place]

## 2023-06-02 NOTE — TELEPHONE ENCOUNTER
Please let him know that his ejection fraction was 51% which is an improvement over where it was before he had his heart stent.  Otherwise there is nothing new on his echo compared to previous.  No changes today.  He is supposed to be moving to [place].  Please encourage him to find a cardiologist there as soon as he gets settled.

## 2023-06-05 NOTE — TELEPHONE ENCOUNTER
Called and left VM. Will continue to try to reach patient.     Lisbeth Hedrick RN  [place] [facility] [place]

## 2023-06-05 NOTE — TELEPHONE ENCOUNTER
I spoke with Lukasz Flynn and updated pt on results/recommendations from provider.  Pt verbalized understanding and has no further questions at this time.    Thank you,    Olga Coffey, RN  Triage [facility] [place] [place]  06/05/23 15:10 EDT

## (undated) DEVICE — CATH GUIDE SOFTVU FLUSH HT STR .035 5F 90CM

## (undated) DEVICE — RADIFOCUS GLIDECATH: Brand: GLIDECATH

## (undated) DEVICE — PK CATH CARD 40

## (undated) DEVICE — 2 TIP PRE-SHAPED 90 MICROCATHETER: Brand: EXCELSIOR SL-10

## (undated) DEVICE — DESTINATION PERIPHERAL GUIDING SHEATH: Brand: DESTINATION

## (undated) DEVICE — TOTAL TRAY, 16FR 10ML SIL FOLEY, URN: Brand: MEDLINE

## (undated) DEVICE — GLIDESHEATH BASIC HYDROPHILIC COATED INTRODUCER SHEATH: Brand: GLIDESHEATH

## (undated) DEVICE — HI-TORQUE SUPRA CORE .035 PERIPHERAL GUIDE WIRE .035 X 190 CM: Brand: HI-TORQUE SUPRA CORE

## (undated) DEVICE — GW EMR FIX EXCHG J STD .035 3MM 260CM

## (undated) DEVICE — RADIFOCUS GLIDEWIRE: Brand: GLIDEWIRE

## (undated) DEVICE — PINNACLE INTRODUCER SHEATH: Brand: PINNACLE

## (undated) DEVICE — CATH DIAG IMPULSE AL1 5F 100CM

## (undated) DEVICE — ADHS SKIN DERMABOND

## (undated) DEVICE — RADIFOCUS TORQUE DEVICE MULTI-TORQUE VISE: Brand: RADIFOCUS TORQUE DEVICE

## (undated) DEVICE — BALN PRESS WEDGE 5F 110CM

## (undated) DEVICE — DEV INFL ALLIANCE2 SYS

## (undated) DEVICE — 200 ML FASTURN SYRINGE WITH QUICK FILL TUBE(ANGIO-SET,PKG,200ML FASTURN SYR W/QFT,MC)(60729695): Brand: MEDRAD® MARK V PROVIS 200ML FASTURN STERILE DISPOSABLE SYRINGE & QFT

## (undated) DEVICE — CATH VENT MIV RADL PIG ST TIP 4F 110CM

## (undated) DEVICE — CATH ANGIO TRCN NB BCN .038 5F 100CM DAV

## (undated) DEVICE — CATH DIAG IMPULSE FR4 5F 100CM

## (undated) DEVICE — GW CERBRL JB PTFE HEPCOAT .035IN 20X180CM

## (undated) DEVICE — GLIDEWIRE GT GUIDE WIRE: Brand: GLIDEWIRE

## (undated) DEVICE — 3M™ IOBAN™ 2 ANTIMICROBIAL INCISE DRAPE 6640EZ: Brand: IOBAN™ 2

## (undated) DEVICE — ST ACC MICROPUNCTURE STFF .018 ECHO/PLDM/TP 4F/10CM 21G/7CM

## (undated) DEVICE — CATH DIAG IMPULSE FL4 5F 100CM

## (undated) DEVICE — KT MANIFLD CARDIAC

## (undated) DEVICE — Device

## (undated) DEVICE — INTRO SHEATH FLX 6F45CM

## (undated) DEVICE — CONTN STRL 32OZ

## (undated) DEVICE — PROGREAT MICROCATHETER SYSTEM: Brand: PROGREAT

## (undated) DEVICE — DESTINATION RENAL GUIDING SHEATH: Brand: DESTINATION

## (undated) DEVICE — GW INQWIRE FC PTFE J/3MM .021 180

## (undated) DEVICE — NEURO  GUIDEWIRE WITH HYDROPHILIC COATING: Brand: SYNCHRO 10

## (undated) DEVICE — DRSNG WND GZ PAD BORDERED 4X8IN STRL

## (undated) DEVICE — CATH GUIDE SOFTVU FLUSH HT PIG .038 5F 110CM

## (undated) DEVICE — Device: Brand: DEFENDO AIR/WATER/SUCTION AND BIOPSY VALVE

## (undated) DEVICE — TUBING, SUCTION, 1/4" X 10', STRAIGHT: Brand: MEDLINE

## (undated) DEVICE — EXTENSION SET, MALE LUER LOCK ADAPTER WITH RETRACTABLE COLLAR

## (undated) DEVICE — CVR PROB 96IN LF STRL

## (undated) DEVICE — 6F .070 AL 1 100CM: Brand: CORDIS

## (undated) DEVICE — INTRO SHEATH ART/FEM ENGAGE .035 5F12CM

## (undated) DEVICE — ESOPHAGEAL BALLOON DILATATION CATHETER: Brand: CRE FIXED WIRE

## (undated) DEVICE — NET RESCUENET F/B RETRV

## (undated) DEVICE — RADIFOCUS GLIDEWIRE ADVANTAGE GUIDEWIRE: Brand: GLIDEWIRE ADVANTAGE

## (undated) DEVICE — CATH SOS OMNI 5FRX80CM SZ2

## (undated) DEVICE — TBG PRESS HI FLX BR 96IN

## (undated) DEVICE — BITEBLOCK OMNI BLOC

## (undated) DEVICE — PK AV FISTL/SHNT 40

## (undated) DEVICE — CATH DIAG IMPULSE IMT 5F 100CM

## (undated) DEVICE — SOL NACL 0.9PCT 1000ML

## (undated) DEVICE — DEV INDEFLATOR

## (undated) DEVICE — GLV SURG BIOGEL M LTX PF 7 1/2

## (undated) DEVICE — FRCP BX RADJAW4 NDL 2.8 240CM LG OG BX40

## (undated) DEVICE — GW HITORQUE/BAL MID/WT J W/HCOAT .014 3X190CM

## (undated) DEVICE — INTRO SHEATH ART/FEM ENGAGE .035 6F12CM